# Patient Record
Sex: MALE | Race: WHITE | NOT HISPANIC OR LATINO | Employment: UNEMPLOYED | URBAN - METROPOLITAN AREA
[De-identification: names, ages, dates, MRNs, and addresses within clinical notes are randomized per-mention and may not be internally consistent; named-entity substitution may affect disease eponyms.]

---

## 2017-01-03 ENCOUNTER — OFFICE VISIT (OUTPATIENT)
Dept: INTERNAL MEDICINE | Facility: CLINIC | Age: 57
End: 2017-01-03

## 2017-01-03 VITALS
WEIGHT: 174 LBS | DIASTOLIC BLOOD PRESSURE: 77 MMHG | HEART RATE: 76 BPM | SYSTOLIC BLOOD PRESSURE: 112 MMHG | BODY MASS INDEX: 21.45 KG/M2

## 2017-01-03 DIAGNOSIS — Z13.220 SCREENING FOR HYPERLIPIDEMIA: ICD-10-CM

## 2017-01-03 DIAGNOSIS — Z11.59 NEED FOR HEPATITIS C SCREENING TEST: Primary | ICD-10-CM

## 2017-01-03 ASSESSMENT — PAIN SCALES - GENERAL: PAINLEVEL: NO PAIN (0)

## 2017-01-03 NOTE — NURSING NOTE
Chief Complaint   Patient presents with     Recheck Medication     Pt is here to recheck his medications.      Kallie Christina LPN January 3, 2017 3:19 PM

## 2017-01-03 NOTE — PATIENT INSTRUCTIONS
[facility] Medication Refill Request Information:  * Please contact your pharmacy regarding ANY request for medication refills.  ** [facility] Prescription Fax = [phone]  * Please allow 3 business days for routine medication refills.  * Please allow 5 business days for controlled substance medication refills.     [facility] Test Result notification information:  *You will be notified with in 7-10 days of your appointment day regarding the results of your test.  If you are on MyChart you will be notified as soon as the provider has reviewed the results and signed off on them.

## 2017-01-03 NOTE — PROGRESS NOTES
HPI: Prasanna Baldwin is a 56 year old male who comes in for ear check as he thinks his ears might need cerumen evacuation as this has been a problem for him in the past.     After this appt his wife is picking him up and they are to visit their oldest son in the psyche unit at [facility] where he has been for 4 days after he was apprehended walking on roofs of cars downtown last week.  He continues to use drugs,  He dropped out of cosmetology school, and be very defiant of is parents. He lives in an apartment with two  other drug using men.      Prasanna has been seeing a counselor who prescribed Lorazepam 2 mg up to 3 x a day and he usually takes it twice a day.  Later in the day and at .     Prasanna has not been sleeping well.  He is suppose to go out of town for three days next week for work but is concerned about leaving his wife with no support.     His youngest son is doing ok and he and his wife have been taking care that their relationship does not suffer.     He is running for stress relief and went for a run before coming in for this appointment.     Patient Active Problem List   Diagnosis     Scapulothoracic syndrome     Gastroesophageal reflux disease without esophagitis       He has a medical hx of  does not have any pertinent problems on file.    Current Outpatient Prescriptions   Medication Sig Dispense Refill     LORAZEPAM PO Take 2 mg by mouth 3 times daily       Ranitidine HCl (ZANTAC PO) 150 mg       tadalafil (CIALIS) 20 MG tablet Take 1 tablet (20 mg) by mouth daily as needed for erectile dysfunction 30 tablet 1     Multiple Vitamin (DAILY MULTIVITAMIN PO) Take  by mouth.           ALLERGIES: Nkda    PAST MEDICAL HX:   Past Medical History   Diagnosis Date     Right rib fracture      9th rib, non-displaced.        PAST SURGICAL HX:   Past Surgical History   Procedure Laterality Date     Cholecystectomy  8/23/2006     Arthroscopic reconstruction anterior cruciate ligament  8/1/2001       IMMUNIZATION HX:    Immunization History   Administered Date(s) Administered     Influenza (IIV3) 12/20/2004     TD (ADULT, 7+) 08/01/2004     TDAP (BOOSTRIX AGES 10-64) 08/10/2012       SOCIAL HX:   Social History     Social History Narrative    , teenage adopted childen.  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      ROS: 3 system ROS reviewed w/o changes except for above      OBJECTIVE:  /77 mmHg  Pulse 76  Wt 78.926 kg (174 lb)   Wt Readings from Last 1 Encounters:   01/03/17 78.926 kg (174 lb)     Constitutional: no distress, comfortable, pleasant   Eyes: anicteric  Ears, Nose and Throat: tympanic membranes clear.   Cardiovascular: RRR nl BP  Respiratory: no distress.   Neurological:  normal gait,normal speech, no tremor   Psychological: appropriate mood     ASSESSMENT/PLAN:  (Z11.59) Need for hepatitis C screening test  (primary encounter diagnosis)  Comment: he has a FIT test lit at home.    Plan: Hepatitis C Screen Reflex to HCV RNA Quant and         Genotype       We discussed the problems with Lorazepam use and physical addiction issues.  He is trying to use it very discretionally.  He plans to continue his counseling for his family issues.     Total time spent 25 minutes.  More than 50% of the time spent with Mr. Baldwin on counseling / coordinating his care    Mara WARD CNP

## 2017-05-02 ENCOUNTER — OFFICE VISIT (OUTPATIENT)
Dept: INTERNAL MEDICINE | Facility: CLINIC | Age: 57
End: 2017-05-02

## 2017-05-02 VITALS
RESPIRATION RATE: 16 BRPM | SYSTOLIC BLOOD PRESSURE: 122 MMHG | BODY MASS INDEX: 20.55 KG/M2 | OXYGEN SATURATION: 98 % | TEMPERATURE: 97.6 F | WEIGHT: 166.6 LBS | DIASTOLIC BLOOD PRESSURE: 80 MMHG | HEART RATE: 70 BPM

## 2017-05-02 DIAGNOSIS — Z11.59 NEED FOR HEPATITIS C SCREENING TEST: ICD-10-CM

## 2017-05-02 DIAGNOSIS — Z13.220 SCREENING FOR HYPERLIPIDEMIA: ICD-10-CM

## 2017-05-02 DIAGNOSIS — Z12.11 SCREEN FOR COLON CANCER: ICD-10-CM

## 2017-05-02 DIAGNOSIS — F41.9 ANXIETY: Primary | ICD-10-CM

## 2017-05-02 RX ORDER — CALCIUM CARBONATE 500 MG/1
1 TABLET, CHEWABLE ORAL WEEKLY
COMMUNITY
End: 2020-11-01

## 2017-05-02 RX ORDER — CITALOPRAM HYDROBROMIDE 20 MG/1
20 TABLET ORAL DAILY
Qty: 90 TABLET | Refills: 3 | Status: SHIPPED | OUTPATIENT
Start: 2017-05-02 | End: 2017-06-27

## 2017-05-02 RX ORDER — TRAZODONE HYDROCHLORIDE 100 MG/1
100 TABLET ORAL
COMMUNITY
End: 2020-09-20

## 2017-05-02 ASSESSMENT — PAIN SCALES - GENERAL: PAINLEVEL: MILD PAIN (2)

## 2017-05-02 NOTE — NURSING NOTE
Chief Complaint   Patient presents with     Cough     pt here for cough x 1 week     Fatigue     pt states being tired all the time     Christina Nichols CMA at 4:12 PM on 5/2/2017.

## 2017-05-02 NOTE — MR AVS SNAPSHOT
After Visit Summary   5/2/2017    Prasanna Baldwin    MRN: [id number]           Patient Information     Date Of Birth          1960        Visit Information        Provider Department      5/2/2017 4:00 PM Mara Beard, APRN CNP [facility]        Today's Diagnoses     Anxiety    -  1    Screen for colon cancer        Need for hepatitis C screening test        Screening for hyperlipidemia          Care Instructions    [facility] Medication Refill Request Information:  * Please contact your pharmacy regarding ANY request for medication refills.  ** [facility] Prescription Fax = [phone]  * Please allow 3 business days for routine medication refills.  * Please allow 5 business days for controlled substance medication refills.     [facility] Test Result notification information:  *You will be notified with in 7-10 days of your appointment day regarding the results of your test.  If you are on MyChart you will be notified as soon as the provider has reviewed the results and signed off on them.    Primary Care Center [phone]           Follow-ups after your visit        Your next 10 appointments already scheduled     Jun 27, 2017 11:00 AM CDT   Return Visit with Dain Marie MD   [facility]    [address] [place] [address] [phone]            Jun 29, 2017 10:30 AM CDT   LAB with  LAB   [facility] ([facility])    [address] [address] [phone]           Patient must bring picture ID.  Patient should be prepared to give a urine specimen  Please do not eat 10-12 hours before your appointment if you are coming in fasting for labs on lipids, cholesterol, or glucose (sugar).  Pregnant women should follow their Care Team instructions. Water with medications is okay. Do not drink coffee or other fluids.   If you have concerns about  taking  your medications, please ask at office or if scheduling via Huoshi, send a message by clicking on Secure Messaging, Message Your Care Team.            Jun 29, 2017 11:45 AM CDT   (Arrive by 11:30 AM)   Return Visit with SLYVIA Flores [facility] ([facility])    [address] [facility]  [address] [facility] [id number] [phone]              Future tests that were ordered for you today     Open Future Orders        Priority Expected Expires Ordered    Hepatitis C Screen Reflex to HCV RNA Quant and Genotype Routine 5/2/2017 5/2/2018 5/2/2017    Lipid panel reflex to direct LDL - -(Today) Routine 5/2/2017 5/2/2018 5/2/2017            Who to contact     Please call your clinic at [phone] to:    Ask questions about your health    Make or cancel appointments    Discuss your medicines    Learn about your test results    Speak to your doctor   If you have compliments or concerns about an experience at your clinic, or if you wish to file a complaint, please contact [facility] Patient Relations at [phone] or email us at [email or web address]         Additional Information About Your Visit        Huoshi Information     Huoshi gives you secure access to your electronic health record. If you see a primary care provider, you can also send messages to your care team and make appointments. If you have questions, please call your primary care clinic.  If you do not have a primary care provider, please call [phone] and they will assist you.      Huoshi is an electronic gateway that provides easy, online access to your medical records. With Huoshi, you can request a clinic appointment, read your test results, renew a prescription or communicate with your care team.     To access your existing account, please contact your [facility] Clinic or call [phone] for assistance.         Care EveryWhere ID     This is your Care EveryWhere ID. This could be used by other organizations to access your [place] medical records  [id number]        Your Vitals Were     Pulse Temperature Respirations Pulse Oximetry BMI (Body Mass Index)       70 97.6  F (36.4  C) (Oral) 16 98% 20.55 kg/m2        Blood Pressure from Last 3 Encounters:   05/02/17 122/80   01/03/17 112/77   07/18/16 140/87    Weight from Last 3 Encounters:   05/02/17 75.6 kg (166 lb 9.6 oz)   01/03/17 78.9 kg (174 lb)   06/28/16 81.2 kg (179 lb)                 Today's Medication Changes          These changes are accurate as of: 5/2/17  5:49 PM.  If you have any questions, ask your nurse or doctor.               Start taking these medicines.        Dose/Directions    citalopram 20 MG tablet   Commonly known as:  celeXA   Used for:  Anxiety   Started by:  Mara Beard APRN CNP        Dose:  20 mg   Take 1 tablet (20 mg) by mouth daily   Quantity:  90 tablet   Refills:  3            Where to get your medicines      These medications were sent to [id number] - [place], [place] - [address] AT [facility] [address]  [facility] [address], [place] [address]    Hours:  24-hours Phone:  [phone]     citalopram 20 MG tablet                Primary Care Provider Office Phone # Fax #    SYLVIA Flores [phone]       [facility] [address] [place] [address] [address]  [facility] [address]        Thank you!     Thank you for choosing [facility]  for your care. Our goal is always to provide you with excellent care. Hearing back from our patients is one way we can continue to improve our services. Please take a few minutes to complete the written survey that you may receive in the mail after your visit with us. Thank you!             Your Updated Medication List - Protect others around you: Learn how to safely use, store and throw away your medicines at  www.disposemymeds.org.          This list is accurate as of: 5/2/17  5:49 PM.  Always use your most recent med list.                   Brand Name Dispense Instructions for use    calcium carbonate 500 MG chewable tablet    TUMS     Take 1 chew tab by mouth daily       citalopram 20 MG tablet    celeXA    90 tablet    Take 1 tablet (20 mg) by mouth daily       DAILY MULTIVITAMIN PO      Take  by mouth.       LORAZEPAM PO      Take 2 mg by mouth 3 times daily       traZODone 100 MG tablet    DESYREL     Take 100 mg by mouth At Bedtime       ZANTAC PO      150 mg

## 2017-05-02 NOTE — PROGRESS NOTES
HPI: Prasanna Baldwin is a 57 year old male who comes in for one week of dry cough, mildly sore throat, headache, decreased concentration,ear discomfort, tightness with breathing, aching all over , general malaise.  He has been home for the past 4 days. His GERD is disrupting his eating and he has limited his food to bland foods.  He has lost weight.  He stopped exercising a week ago.  He takes a daily vitamin and has been taking extra vitamin C since developing above symptoms.  He does not feel he is improving.  He denies fever but sleeps with many blankets over him and still feels cold.     He thinks his weight loss is related to stress at home.  His oldest son is in his fifth treatment program since the first of the year.   Things are strained at home. He is taking Lorazepam up to 3 x a day.    He exercises as a stress reliever.     Patient Active Problem List   Diagnosis     Scapulothoracic syndrome     Gastroesophageal reflux disease without esophagitis       He has a medical hx of  does not have any pertinent problems on file.    Current Outpatient Prescriptions   Medication Sig Dispense Refill     LORAZEPAM PO Take 2 mg by mouth 3 times daily       Ranitidine HCl (ZANTAC PO) 150 mg       tadalafil (CIALIS) 20 MG tablet Take 1 tablet (20 mg) by mouth daily as needed for erectile dysfunction 30 tablet 1     Multiple Vitamin (DAILY MULTIVITAMIN PO) Take  by mouth.           ALLERGIES: Nkda [no known drug allergies]    PAST MEDICAL HX:   Past Medical History:   Diagnosis Date     Right rib fracture     9th rib, non-displaced.        PAST SURGICAL HX:   Past Surgical History:   Procedure Laterality Date     ARTHROSCOPIC RECONSTRUCTION ANTERIOR CRUCIATE LIGAMENT  8/1/2001     CHOLECYSTECTOMY  8/23/2006       IMMUNIZATION HX:   Immunization History   Administered Date(s) Administered     Influenza (IIV3) 12/20/2004     TD (ADULT, 7+) 08/01/2004     TDAP Vaccine (Boostrix) 08/10/2012       SOCIAL HX:   Social History      Social History Narrative    , teenage adopted childen.  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        ROS:   CONSTITUTIONAL: see HPI.  SKIN: no worrisome rashes, no worrisome moles, no worrisome lesions  EYES: no acute vision problems or changes  ENT: no ear problems, no mouth problems, no throat problems  RESP:see HPI  CV: no chest pain, no palpitations, no new or worsening peripheral edema  GI: he has GERD and decreased appetite  : no frequency, no dysuria, no hematuria.  MS: see HPI  NEURO: see HPI  HEME: no easy bruising, no bleeding problems  PSYCHIATRIC: he feels anxious.    OBJECTIVE:  /80 (BP Location: Right arm, Patient Position: Chair, Cuff Size: Adult Large)  Pulse 70  Resp 16  Wt 75.6 kg (166 lb 9.6 oz)  SpO2 98%  BMI 20.55 kg/m2   Wt Readings from Last 1 Encounters:   05/02/17 75.6 kg (166 lb 9.6 oz)   his weight is down 8.5 pounds since 1/2017. Temp 98.7  Constitutional: no distress, comfortable, pleasant   Eyes: anicteric,conjunctiva pink.  Ears, Nose and Throat: tympanic membranes clear,  throat clear.   Neck: supple with full range of motion, no thyromegaly.   Cardiovascular: regular rate and rhythm, normal S1 and S2, no murmurs, rubs or gallops.  Respiratory: clear to auscultation, no wheezes or crackles, normal breath sounds   Gastrointestinal: positive bowel sounds, nontender, no hepatosplenomegaly, no masses   Musculoskeletal: full range of motion, no edema   Skin: no concerning lesions, no jaundice, temp normal, tanned.  Neurological: normal gait,normal speech, no tremor   Psychological: appropriate mood   LYMPH: no cervical,  supraclavicular, infraclavicular l nodes.    ASSESSMENT/PLAN:  Prasanna was seen today for cough and fatigue.    Diagnoses and all orders for this visit:    Anxiety  -     citalopram (CELEXA) 20 MG tablet; Take 1 tablet (20 mg) by mouth daily.  He is to message me with results of the new medication with the goal of  weaning off the Lorazepam.      Screen for colon cancer    Need for hepatitis C screening test  -     Hepatitis C Screen Reflex to HCV RNA Quant and Genotype; Future    Screening for hyperlipidemia  -     Lipid panel reflex to direct LDL - -(Today); Future    He will perform labs on RTC in June when he present for his annual physical.        Total time spent  25 minutes.  More than 50% of the time spent with Mr. Baldwin on counseling / coordinating his care    Mara WARD, CNP

## 2017-05-02 NOTE — LETTER
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  I have recommended he avoid returning to work until he is asymptomatic and should not return until at least 5/5 if he is feeling improved.                 Mara WARD, CNP

## 2017-05-02 NOTE — PATIENT INSTRUCTIONS
[facility] Medication Refill Request Information:  * Please contact your pharmacy regarding ANY request for medication refills.  ** [facility] Prescription Fax = [phone]  * Please allow 3 business days for routine medication refills.  * Please allow 5 business days for controlled substance medication refills.     [facility] Test Result notification information:  *You will be notified with in 7-10 days of your appointment day regarding the results of your test.  If you are on MyChart you will be notified as soon as the provider has reviewed the results and signed off on them.    [facility] [phone]

## 2017-05-03 ENCOUNTER — HOSPITAL ENCOUNTER (OUTPATIENT)
Facility: CLINIC | Age: 57
Setting detail: SPECIMEN
Discharge: HOME | End: 2017-05-03
Admitting: NURSE PRACTITIONER
Payer: COMMERCIAL

## 2017-05-03 PROCEDURE — 82274 ASSAY TEST FOR BLOOD FECAL: CPT | Performed by: NURSE PRACTITIONER

## 2017-05-04 DIAGNOSIS — Z12.11 SCREEN FOR COLON CANCER: ICD-10-CM

## 2017-05-04 LAB — HEMOCCULT STL QL IA: POSITIVE

## 2017-05-06 DIAGNOSIS — K92.1 BLOOD IN STOOL: Primary | ICD-10-CM

## 2017-05-07 ENCOUNTER — MYC MEDICAL ADVICE (OUTPATIENT)
Dept: INTERNAL MEDICINE | Facility: CLINIC | Age: 57
End: 2017-05-07

## 2017-05-08 ENCOUNTER — TELEPHONE (OUTPATIENT)
Dept: GASTROENTEROLOGY | Facility: CLINIC | Age: 57
End: 2017-05-08

## 2017-05-09 ENCOUNTER — TELEPHONE (OUTPATIENT)
Dept: GASTROENTEROLOGY | Facility: CLINIC | Age: 57
End: 2017-05-09

## 2017-06-27 ENCOUNTER — OFFICE VISIT (OUTPATIENT)
Dept: NEUROLOGY | Facility: CLINIC | Age: 57
End: 2017-06-27
Payer: COMMERCIAL

## 2017-06-27 VITALS
WEIGHT: 165 LBS | DIASTOLIC BLOOD PRESSURE: 63 MMHG | SYSTOLIC BLOOD PRESSURE: 104 MMHG | OXYGEN SATURATION: 97 % | HEIGHT: 74 IN | HEART RATE: 61 BPM | BODY MASS INDEX: 21.17 KG/M2

## 2017-06-27 DIAGNOSIS — G56.80 SCAPULOTHORACIC SYNDROME: Primary | ICD-10-CM

## 2017-06-27 PROCEDURE — 99212 OFFICE O/P EST SF 10 MIN: CPT | Performed by: PSYCHIATRY & NEUROLOGY

## 2017-06-27 ASSESSMENT — PAIN SCALES - GENERAL: PAINLEVEL: MILD PAIN (2)

## 2017-06-27 NOTE — NURSING NOTE
"Prasanna Baldwin's goals for this visit include: return  He requests these members of his care team be copied on today's visit information:     PCP: Mara Beard    Referring Provider:  ESTABLISHED PATIENT  No address on file    Chief Complaint   Patient presents with     RECHECK       Initial /63  Pulse 61  Ht 1.88 m (6' 2\")  Wt 74.8 kg (165 lb)  SpO2 97%  BMI 21.18 kg/m2 Estimated body mass index is 21.18 kg/(m^2) as calculated from the following:    Height as of this encounter: 1.88 m (6' 2\").    Weight as of this encounter: 74.8 kg (165 lb).  Medication Reconciliation: complete    Do you need any medication refills at today's visit? n  "

## 2017-06-27 NOTE — PROGRESS NOTES
[id number]    Dictation destroyed by computer hack. Visit note based upon recollection:    Continued difficulty using upper limbs in specific postures as before. Exam notable for winging of right scapula at rest; not with activation of serratus. Strength full throughout, including trapezius, except for right rhomboid which is weak with reduced ROM as well.     Neuromuscular exam in short is little different except perhaps for more prominent scapular winging at this visit than at some previous visits. Offered ortho re-evaluation and he declines.    RTC 1 year or PRN.

## 2017-06-27 NOTE — LETTER
June 27, 2017      RE: Prasanna Baldwin  [address] [place] [address]      [id number]    Dictation destroyed by computer hack. Visit note based upon recollection:    Continued difficulty using upper limbs in specific postures as before. Exam notable for winging of right scapula at rest; not with activation of serratus. Strength full throughout, including trapezius, except for right rhomboid which is weak with reduced ROM as well.     Neuromuscular exam in short is little different except perhaps for more prominent scapular winging at this visit than at some previous visits. Offered ortho re-evaluation and he declines.    RTC 1 year or PRN.    Dain Marie MD

## 2017-06-27 NOTE — MR AVS SNAPSHOT
After Visit Summary   6/27/2017    Prasanna Baldwin    MRN: [id number]           Patient Information     Date Of Birth          1960        Visit Information        Provider Department      6/27/2017 11:00 AM Dain Marie MD [facility]         Follow-ups after your visit        Your next 10 appointments already scheduled     Jun 29, 2017 10:30 AM CDT   LAB with  LAB   [facility] ([facility])    [address]  [address] [facility] [id number] [phone]           Patient must bring picture ID.  Patient should be prepared to give a urine specimen  Please do not eat 10-12 hours before your appointment if you are coming in fasting for labs on lipids, cholesterol, or glucose (sugar).  Pregnant women should follow their Care Team instructions. Water with medications is okay. Do not drink coffee or other fluids.   If you have concerns about taking  your medications, please ask at office or if scheduling via Bee On The Got, send a message by clicking on Secure Messaging, Message Your Care Team.            Jun 29, 2017 11:45 AM CDT   (Arrive by 11:30 AM)   Return Visit with SYLVIA Flores CNP   [facility] ([facility])    [address] [address] [phone]            Jun 26, 2018 11:00 AM CDT   [facility]   [facility]    [address] [place] [address] [phone]              Who to contact     If you have questions or need follow up information about today's clinic visit or your schedule please contact [facility] directly at [phone].  Normal or non-critical lab and imaging results will be communicated to you by MyChart, letter or phone within 4 business days after the clinic has received the results. If you do not hear from us within 7 days, please contact  "the clinic through Onevestt or phone. If you have a critical or abnormal lab result, we will notify you by phone as soon as possible.  Submit refill requests through Tangoe or call your pharmacy and they will forward the refill request to us. Please allow 3 business days for your refill to be completed.          Additional Information About Your Visit        OMGPOPhart Information     Tangoe gives you secure access to your electronic health record. If you see a primary care provider, you can also send messages to your care team and make appointments. If you have questions, please call your primary care clinic.  If you do not have a primary care provider, please call [phone] and they will assist you.      Tangoe is an electronic gateway that provides easy, online access to your medical records. With Tangoe, you can request a clinic appointment, read your test results, renew a prescription or communicate with your care team.     To access your existing account, please contact your [facility] or call [phone] for assistance.        Care EveryWhere ID     This is your Care EveryWhere ID. This could be used by other organizations to access your [place] medical records  [id number]        Your Vitals Were     Pulse Height Pulse Oximetry BMI (Body Mass Index)          61 1.88 m (6' 2\") 97% 21.18 kg/m2         Blood Pressure from Last 3 Encounters:   06/27/17 104/63   05/02/17 122/80   01/03/17 112/77    Weight from Last 3 Encounters:   06/27/17 74.8 kg (165 lb)   05/02/17 75.6 kg (166 lb 9.6 oz)   01/03/17 78.9 kg (174 lb)              Today, you had the following     No orders found for display       Primary Care Provider Office Phone # Fax #    Mara SYLVIA Castorena [phone]       PRIMARY CARE CENTER [address]  [facility] [address]        Equal Access to Services     FAUSTINO KENYON: bethel Hodge qaybta " rafat lunusrat domingo ah. So [facility] [phone].    ATENCIÓN: Si jailyn ruiz, tiene a vickers disposición servicios gratuitos de asistencia lingüística. Herlinda al [phone].    We comply with applicable federal civil rights laws and [place] laws. We do not discriminate on the basis of race, color, national origin, age, disability sex, sexual orientation or gender identity.            Thank you!     Thank you for choosing [facility]  for your care. Our goal is always to provide you with excellent care. Hearing back from our patients is one way we can continue to improve our services. Please take a few minutes to complete the written survey that you may receive in the mail after your visit with us. Thank you!             Your Updated Medication List - Protect others around you: Learn how to safely use, store and throw away your medicines at www.disposemymeds.org.          This list is accurate as of: 6/27/17 11:41 AM.  Always use your most recent med list.                   Brand Name Dispense Instructions for use Diagnosis    calcium carbonate 500 MG chewable tablet    TUMS     Take 1 chew tab by mouth daily        DAILY MULTIVITAMIN PO      Take  by mouth.        LORAZEPAM PO      Take 2 mg by mouth 3 times daily        traZODone 100 MG tablet    DESYREL     Take 100 mg by mouth At Bedtime        ZANTAC PO      150 mg

## 2017-06-29 ENCOUNTER — OFFICE VISIT (OUTPATIENT)
Dept: INTERNAL MEDICINE | Facility: CLINIC | Age: 57
End: 2017-06-29

## 2017-06-29 VITALS
RESPIRATION RATE: 16 BRPM | HEIGHT: 74 IN | SYSTOLIC BLOOD PRESSURE: 108 MMHG | HEART RATE: 65 BPM | DIASTOLIC BLOOD PRESSURE: 75 MMHG | BODY MASS INDEX: 21.33 KG/M2 | WEIGHT: 166.2 LBS

## 2017-06-29 DIAGNOSIS — R63.4 LOSS OF WEIGHT: Primary | ICD-10-CM

## 2017-06-29 DIAGNOSIS — Z13.220 SCREENING FOR HYPERLIPIDEMIA: ICD-10-CM

## 2017-06-29 DIAGNOSIS — R63.4 LOSS OF WEIGHT: ICD-10-CM

## 2017-06-29 DIAGNOSIS — Z11.59 NEED FOR HEPATITIS C SCREENING TEST: ICD-10-CM

## 2017-06-29 DIAGNOSIS — R74.01 ELEVATED ALT MEASUREMENT: ICD-10-CM

## 2017-06-29 DIAGNOSIS — R10.13 ABDOMINAL PAIN, EPIGASTRIC: ICD-10-CM

## 2017-06-29 LAB
ALBUMIN SERPL-MCNC: 3.6 G/DL (ref 3.4–5)
ALP SERPL-CCNC: 50 U/L (ref 40–150)
ALT SERPL W P-5'-P-CCNC: 71 U/L (ref 0–70)
ANION GAP SERPL CALCULATED.3IONS-SCNC: 9 MMOL/L (ref 3–14)
AST SERPL W P-5'-P-CCNC: 34 U/L (ref 0–45)
BILIRUB SERPL-MCNC: 0.8 MG/DL (ref 0.2–1.3)
BUN SERPL-MCNC: 6 MG/DL (ref 7–30)
CALCIUM SERPL-MCNC: 8.6 MG/DL (ref 8.5–10.1)
CHLORIDE SERPL-SCNC: 101 MMOL/L (ref 94–109)
CHOLEST SERPL-MCNC: 174 MG/DL
CO2 SERPL-SCNC: 24 MMOL/L (ref 20–32)
CREAT SERPL-MCNC: 0.69 MG/DL (ref 0.66–1.25)
ERYTHROCYTE [DISTWIDTH] IN BLOOD BY AUTOMATED COUNT: 11.9 % (ref 10–15)
GFR SERPL CREATININE-BSD FRML MDRD: ABNORMAL ML/MIN/1.7M2
GLUCOSE SERPL-MCNC: 84 MG/DL (ref 70–99)
HCT VFR BLD AUTO: 43.5 % (ref 40–53)
HDLC SERPL-MCNC: 74 MG/DL
HGB BLD-MCNC: 15.4 G/DL (ref 13.3–17.7)
LDLC SERPL CALC-MCNC: 83 MG/DL
MCH RBC QN AUTO: 32.3 PG (ref 26.5–33)
MCHC RBC AUTO-ENTMCNC: 35.4 G/DL (ref 31.5–36.5)
MCV RBC AUTO: 91 FL (ref 78–100)
NONHDLC SERPL-MCNC: 100 MG/DL
PLATELET # BLD AUTO: 182 10E9/L (ref 150–450)
POTASSIUM SERPL-SCNC: 4.3 MMOL/L (ref 3.4–5.3)
PROT SERPL-MCNC: 7.1 G/DL (ref 6.8–8.8)
RBC # BLD AUTO: 4.77 10E12/L (ref 4.4–5.9)
SODIUM SERPL-SCNC: 135 MMOL/L (ref 133–144)
TRIGL SERPL-MCNC: 85 MG/DL
WBC # BLD AUTO: 3.9 10E9/L (ref 4–11)

## 2017-06-29 ASSESSMENT — PAIN SCALES - GENERAL: PAINLEVEL: MODERATE PAIN (4)

## 2017-06-29 NOTE — PROGRESS NOTES
HPI: Prasanna Baldwin is a 57 year old male who comes in after seeing his mental health provider.  He did not feel well after starting Zoloft so he has a new RX sent to his pharmacy today.  He is not sure what it is.  He is not sleeping well.  He still has abdominal pain and has been losing weight. He cannot eat fresh fruit or vegetables.   He stopped running because of his weight loss. He still lifts weights twice a week. He is still drinking a couple of beers at night to relax.  His FIT 5/3/2017 test was positive but he thinks this is all related to his stress and he would like to repeat this at a slightly later time.      He and his wife re going to a marriage counselor and they are each seeing an individual counselor as well. He is feeling that he and his wife might be headed for a divorce since she is very negative toward their younger son Juan and himself.     He is asking this provider to see his 16 year old son as a patient.     Patient Active Problem List   Diagnosis     Scapulothoracic syndrome     Gastroesophageal reflux disease without esophagitis       Current Outpatient Prescriptions   Medication Sig Dispense Refill     ranitidine (ZANTAC) 300 MG tablet Take 1 tablet (300 mg) by mouth At Bedtime 90 tablet 3     traZODone (DESYREL) 100 MG tablet Take 100 mg by mouth At Bedtime       calcium carbonate (TUMS) 500 MG chewable tablet Take 1 chew tab by mouth daily       LORAZEPAM PO Take 2 mg by mouth 3 times daily       Ranitidine HCl (ZANTAC PO) 150 mg       Multiple Vitamin (DAILY MULTIVITAMIN PO) Take  by mouth.           ALLERGIES: Nkda [no known drug allergies]    PAST MEDICAL HX:   Past Medical History:   Diagnosis Date     Right rib fracture     9th rib, non-displaced.        PAST SURGICAL HX:   Past Surgical History:   Procedure Laterality Date     ARTHROSCOPIC RECONSTRUCTION ANTERIOR CRUCIATE LIGAMENT  8/1/2001     CHOLECYSTECTOMY  8/23/2006       IMMUNIZATION HX:   Immunization History  "  Administered Date(s) Administered     Influenza (IIV3) 12/20/2004     TD (ADULT, 7+) 08/01/2004     TDAP Vaccine (Boostrix) 08/10/2012       SOCIAL HX:   Social History     Social History Narrative    , teenage adopted childen.  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        ROS:   CONSTITUTIONAL: no fatigue, he has had weight loss. He has lost 16pounds since 10/22/15.   SKIN: no worrisome rashes, no worrisome moles, no worrisome lesions  EYES: no acute vision problems or changes  ENT: no ear problems, no mouth problems, no throat problems  RESP: no significant cough, no shortness of breath  CV: no chest pain, no palpitations, no new or worsening peripheral edema  GI: no nausea, no vomiting, no constipation, no diarrhea  : no frequency, no dysuria, no hematuria  MS: no claudication, no myalgias, no joint aches  NEURO: no weakness, no dizziness, no syncope, no headaches  ENDOCRINE: no temperature intolerance, no skin/hair changes  HEME: no easy bruising, no bleeding problems  PSYCHIATRIC: he feels extremely stressed.     OBJECTIVE:  /75  Pulse 65  Resp 16  Ht 1.88 m (6' 2\")  Wt 75.4 kg (166 lb 3.2 oz)  BMI 21.34 kg/m2   Wt Readings from Last 1 Encounters:   06/29/17 75.4 kg (166 lb 3.2 oz)     Constitutional: no distress, comfortable, pleasant   Eyes: anicteric,conjunctiva pink, normal extra-ocular movements   Ears, Nose and Throat: tympanic membranes clear, nose clear and free of lesions, throat clear.   Neck: supple with full range of motion, no thyromegaly.   Cardiovascular: regular rate and rhythm, normal S1 and S2, no murmurs, rubs or gallops, peripheral pulses full and symmetric   Respiratory: clear to auscultation, no wheezes or crackles, normal breath sounds   Gastrointestinal: positive bowel sounds, nontender, no hepatosplenomegaly, no masses   Musculoskeletal: full range of motion, no edema   Skin: no concerning lesions, no jaundice, temp normal "   Neurological: normal gait,normal speech, no tremor   Psychological: appropriate mood     ASSESSMENT/PLAN:  Prasanna was seen today for physical.    Diagnoses and all orders for this visit:    Loss of weight  -     CBC with platelets; Future  -     Comprehensive metabolic panel; Future    Abdominal pain, epigastric  -     Fecal cancer screen FIT; Future  -     ranitidine (ZANTAC) 300 MG tablet; Take 1 tablet (300 mg) by mouth At Bedtime    Elevated ALT measurement  -     ALT; Future to assess for trends.     Total time spent 40 minutes.  More than 50% of the time spent with Mr. Baldwin on counseling / coordinating his care    Mara WARD, CNP

## 2017-06-29 NOTE — MR AVS SNAPSHOT
After Visit Summary   6/29/2017    Prasanna Baldwin    MRN: [id number]           Patient Information     Date Of Birth          1960        Visit Information        Provider Department      6/29/2017 11:45 AM Mara Beard, APRN CNP [facility]        Today's Diagnoses     Loss of weight    -  1    Abdominal pain, epigastric        Elevated ALT measurement          Care Instructions    Primary Care Center Medication Refill Request Information:  * Please contact your pharmacy regarding ANY request for medication refills.  ** [facility] Prescription Fax = [phone]  * Please allow 3 business days for routine medication refills.  * Please allow 5 business days for controlled substance medication refills.     Primary Care Center Test Result notification information:  *You will be notified with in 7-10 days of your appointment day regarding the results of your test.  If you are on MyChart you will be notified as soon as the provider has reviewed the results and signed off on them.    Primary Care Center [phone]             Follow-ups after your visit        Your next 10 appointments already scheduled     Jun 26, 2018 11:00 AM CDT   Return Visit with Dain Marie MD   [facility]    [address] [place] [address] [phone]              Future tests that were ordered for you today     Open Future Orders        Priority Expected Expires Ordered    ALT Routine 12/29/2017 6/29/2018 6/29/2017    Fecal cancer screen FIT Routine 6/29/2017 9/27/2017 6/29/2017            Who to contact     Please call your clinic at [phone] to:    Ask questions about your health    Make or cancel appointments    Discuss your medicines    Learn about your test results    Speak to your doctor   If you have compliments or concerns about an experience at your clinic, or if you wish to file a complaint, please contact [facility]  "Physicians Patient Relations at [phone] or email us at [email or web address]         Additional Information About Your Visit        SingleHophart Information     Verious gives you secure access to your electronic health record. If you see a primary care provider, you can also send messages to your care team and make appointments. If you have questions, please call your primary care clinic.  If you do not have a primary care provider, please call [phone] and they will assist you.      Verious is an electronic gateway that provides easy, online access to your medical records. With Verious, you can request a clinic appointment, read your test results, renew a prescription or communicate with your care team.     To access your existing account, please contact your [facility] or call [phone] for assistance.        Care EveryWhere ID     This is your Care EveryWhere ID. This could be used by other organizations to access your [place] medical records  [id number]        Your Vitals Were     Pulse Respirations Height BMI (Body Mass Index)          65 16 1.88 m (6' 2\") 21.34 kg/m2         Blood Pressure from Last 3 Encounters:   06/29/17 108/75   06/27/17 104/63   05/02/17 122/80    Weight from Last 3 Encounters:   06/29/17 75.4 kg (166 lb 3.2 oz)   06/27/17 74.8 kg (165 lb)   05/02/17 75.6 kg (166 lb 9.6 oz)                 Today's Medication Changes          These changes are accurate as of: 6/29/17 12:24 PM.  If you have any questions, ask your nurse or doctor.               These medicines have changed or have updated prescriptions.        Dose/Directions    ranitidine 300 MG tablet   Commonly known as:  ZANTAC   This may have changed:    - medication strength  - how much to take  - how to take this  - when to take this   Used for:  Abdominal pain, epigastric   Changed by:  Mara Beard APRN CNP        Dose:  300 mg   Take 1 tablet (300 mg) by mouth At Bedtime "   Quantity:  90 tablet   Refills:  3            Where to get your medicines      These medications were sent to [id number] - [place], [place] - [address] AT [facility]  [address], [place] [address]    Hours:  24-hours Phone:  [phone]     ranitidine 300 MG tablet                Primary Care Provider Office Phone # Fax #    Mara Beard, APRN -[phone]       PRIMARY CARE CENTER [address] [place] [address] [address]  [facility] [place] [facility] [address]        Equal Access to Services     FAUSTINO WHITTAKER : Hadii aad ku hadasho Soomaali, waaxda luqadaha, qaybta kaalmada adeegyada, rafat kasper hayaajerry domingo . So [facility] [phone].    ATENCIÓN: Si habla español, tiene a vickers disposición servicios gratuitos de asistencia lingüística. [facility] [phone].    We comply with applicable federal civil rights laws and [place] laws. We do not discriminate on the basis of race, color, national origin, age, disability sex, sexual orientation or gender identity.            Thank you!     Thank you for choosing [facility]  for your care. Our goal is always to provide you with excellent care. Hearing back from our patients is one way we can continue to improve our services. Please take a few minutes to complete the written survey that you may receive in the mail after your visit with us. Thank you!             Your Updated Medication List - Protect others around you: Learn how to safely use, store and throw away your medicines at www.disposemymeds.org.          This list is accurate as of: 6/29/17 12:24 PM.  Always use your most recent med list.                   Brand Name Dispense Instructions for use Diagnosis    calcium carbonate 500 MG chewable tablet    TUMS     Take 1 chew tab by mouth daily        DAILY MULTIVITAMIN PO      Take  by mouth.        LORAZEPAM PO      Take 2 mg by mouth 3 times daily        ranitidine 300 MG tablet    ZANTAC    90  tablet    Take 1 tablet (300 mg) by mouth At Bedtime    Abdominal pain, epigastric       traZODone 100 MG tablet    DESYREL     Take 100 mg by mouth At Bedtime

## 2017-06-29 NOTE — PATIENT INSTRUCTIONS
[facility] Medication Refill Request Information:  * Please contact your pharmacy regarding ANY request for medication refills.  ** [facility] Prescription Fax = [phone]  * Please allow 3 business days for routine medication refills.  * Please allow 5 business days for controlled substance medication refills.     [facility] Test Result notification information:  *You will be notified with in 7-10 days of your appointment day regarding the results of your test.  If you are on MyChart you will be notified as soon as the provider has reviewed the results and signed off on them.    [facility] [phone]

## 2017-06-29 NOTE — NURSING NOTE
Chief Complaint   Patient presents with     Physical     pt here for physical     Christina Nichols CMA at 11:13 AM on 6/29/2017.

## 2017-07-03 LAB — HCV AB SERPL QL IA: NORMAL

## 2017-07-12 ENCOUNTER — MYC MEDICAL ADVICE (OUTPATIENT)
Dept: INTERNAL MEDICINE | Facility: CLINIC | Age: 57
End: 2017-07-12

## 2017-08-04 NOTE — PROGRESS NOTES
2017            Mara Beard NP    Primary Care Center   [address] [facility] [facility] [address]   [place], [place]  [address]       RE:  Prasanna Baldwin   MRN:  [id number]   :  1960      Dear Mara:      I saw Prasanna Baldwin in followup at the [facility] in [place].  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  Dr. Baldwin reports that over the past year he has noticed a limitation of movement at the right shoulder joint with certain activities such as most notably flexing at the shoulder joint.  He at times has to raise his right upper limb manually with his left to get it past a certain angle or has difficulty holding it outward in the motion used to shake a person's hand.  He has noticed no other substantial change, although he does feel that exercising with light weights every other day is necessary to maintain function in that limb.  There has been some discomfort in the left shoulder, but again, it remains modest and changes little.      PHYSICAL EXAMINATION:  The patient appears well.  Vital signs are normal.  Manual muscle testing demonstrates full strength in shoulder abduction in both the deltoid and supraspinatus ranges, full strength in infraspinatus, pectoralis and symmetric contraction of the latissimus with coughing.  The rest of his strength is full.  Reflexes are symmetric and preserved.  There are no pathologic reflexes.  As before, however, there is scapular winging at rest on the right which appears somewhat more prominent to me than it has in recent years.  There is not rotation of the scapula at rest.  The winging is less evident when forcefully pressing against the wall. Trapezius strength is full.      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  Romberg strength appears  reduced as well.  There is no difficulty on the left.      There are modest changes for the worse.  We discussed further evaluation of this with an orthopedic specialist or physical therapy.  I did not recommend further electrodiagnostic studies, however.  Dr. Baldwin prefers continue to treat this conservatively rather than seek further opinions.  He should contact me for referral if his condition worsens, however, and I would consider further electrodiagnostic studies under that circumstance as well.         Sincerely,      ANA DENNIS MD             D: 2017 11:45   T: 2017 16:44   MT: TS      Name:     JUAN C, WENDY   MRN:      [id number]        Account:      [id number]   :      1960      Document: [id number]       cc: Mara Beard NP

## 2017-11-30 ASSESSMENT — ENCOUNTER SYMPTOMS
DEPRESSION: 1
NERVOUS/ANXIOUS: 1

## 2017-12-14 ENCOUNTER — OFFICE VISIT (OUTPATIENT)
Dept: INTERNAL MEDICINE | Facility: CLINIC | Age: 57
End: 2017-12-14
Payer: COMMERCIAL

## 2017-12-14 VITALS
OXYGEN SATURATION: 99 % | WEIGHT: 166.2 LBS | HEART RATE: 79 BPM | HEIGHT: 74 IN | DIASTOLIC BLOOD PRESSURE: 79 MMHG | BODY MASS INDEX: 21.33 KG/M2 | SYSTOLIC BLOOD PRESSURE: 121 MMHG

## 2017-12-14 DIAGNOSIS — Z01.810 PRE-OPERATIVE CARDIOVASCULAR EXAMINATION: Primary | ICD-10-CM

## 2017-12-14 DIAGNOSIS — Z01.810 PRE-OPERATIVE CARDIOVASCULAR EXAMINATION: ICD-10-CM

## 2017-12-14 LAB
ALBUMIN SERPL-MCNC: 3.7 G/DL (ref 3.4–5)
ALP SERPL-CCNC: 70 U/L (ref 40–150)
ALT SERPL W P-5'-P-CCNC: 24 U/L (ref 0–70)
ANION GAP SERPL CALCULATED.3IONS-SCNC: 4 MMOL/L (ref 3–14)
AST SERPL W P-5'-P-CCNC: 20 U/L (ref 0–45)
BILIRUB SERPL-MCNC: 0.6 MG/DL (ref 0.2–1.3)
BUN SERPL-MCNC: 8 MG/DL (ref 7–30)
CALCIUM SERPL-MCNC: 8.4 MG/DL (ref 8.5–10.1)
CHLORIDE SERPL-SCNC: 96 MMOL/L (ref 94–109)
CO2 SERPL-SCNC: 32 MMOL/L (ref 20–32)
CREAT SERPL-MCNC: 0.74 MG/DL (ref 0.66–1.25)
ERYTHROCYTE [DISTWIDTH] IN BLOOD BY AUTOMATED COUNT: 11.7 % (ref 10–15)
GFR SERPL CREATININE-BSD FRML MDRD: >90 ML/MIN/1.7M2
GLUCOSE SERPL-MCNC: 119 MG/DL (ref 70–99)
HCT VFR BLD AUTO: 43.1 % (ref 40–53)
HGB BLD-MCNC: 14.8 G/DL (ref 13.3–17.7)
INTERPRETATION ECG - MUSE: NORMAL
MCH RBC QN AUTO: 31.8 PG (ref 26.5–33)
MCHC RBC AUTO-ENTMCNC: 34.3 G/DL (ref 31.5–36.5)
MCV RBC AUTO: 93 FL (ref 78–100)
PLATELET # BLD AUTO: 177 10E9/L (ref 150–450)
POTASSIUM SERPL-SCNC: 3.8 MMOL/L (ref 3.4–5.3)
PROT SERPL-MCNC: 7 G/DL (ref 6.8–8.8)
RBC # BLD AUTO: 4.66 10E12/L (ref 4.4–5.9)
SODIUM SERPL-SCNC: 132 MMOL/L (ref 133–144)
WBC # BLD AUTO: 4.7 10E9/L (ref 4–11)

## 2017-12-14 ASSESSMENT — PAIN SCALES - GENERAL: PAINLEVEL: NO PAIN (0)

## 2017-12-14 NOTE — MR AVS SNAPSHOT
After Visit Summary   12/14/2017    Prasanna Baldwin    MRN: [id number]           Patient Information     Date Of Birth          1960        Visit Information        Provider Department      12/14/2017 4:40 PM Mara Beard APRN CNP [facility]        Today's Diagnoses     Pre-operative cardiovascular examination    -  1      Care Instructions    Please go to the lab on the first floor before you leave today.           Follow-ups after your visit        Your next 10 appointments already scheduled     Jun 26, 2018 11:00 AM CDT   [facility] Visit with Dain Marie MD   [facility]    [id number] [place] [id number] [phone]              Who to contact     Please call your clinic at [phone] to:    Ask questions about your health    Make or cancel appointments    Discuss your medicines    Learn about your test results    Speak to your doctor   If you have compliments or concerns about an experience at your clinic, or if you wish to file a complaint, please contact [facility] Patient Relations at [phone] or email us at [email or web address]         Additional Information About Your Visit        MyChart Information     The[employer] gives you secure access to your electronic health record. If you see a primary care provider, you can also send messages to your care team and make appointments. If you have questions, please call your primary care clinic.  If you do not have a primary care provider, please call [phone] and they will assist you.      TheTakest is an electronic gateway that provides easy, online access to your medical records. With [employer], you can request a clinic appointment, read your test results, renew a prescription or communicate with your care team.     To access your existing account, please contact your [facility] Clinic or call  "[phone] for assistance.        Care EveryWhere ID     This is your Care EveryWhere ID. This could be used by other organizations to access your [facility] medical records  [id number]        Your Vitals Were     Pulse Height Pulse Oximetry BMI (Body Mass Index)          79 1.88 m (6' 2\") 99% 21.34 kg/m2         Blood Pressure from Last 3 Encounters:   12/14/17 121/79   06/29/17 108/75   06/27/17 104/63    Weight from Last 3 Encounters:   12/14/17 75.4 kg (166 lb 3.2 oz)   06/29/17 75.4 kg (166 lb 3.2 oz)   06/27/17 74.8 kg (165 lb)              We Performed the Following     EKG Performed in Clinic w/ Provider Reading Fee        Primary Care Provider Office Phone # Fax #    SYLVIA Flores [phone]       [address] [place] [phone]  [facility] [address]        Equal Access to Services     FAUSTINO WHITTAKER : Hadii aad ku hadasho Soomaali, waaxda luqadaha, qaybta kaalmada adeegyada, waxay idiin hayaan adeeg kharareagan laelvin . So [facility] [phone].    ATENCIÓN: Si habla español, tiene a vickers disposición servicios gratuitos de asistencia lingüística. Llame al [phone].    We comply with applicable federal civil rights laws and [place] laws. We do not discriminate on the basis of race, color, national origin, age, disability, sex, sexual orientation, or gender identity.            Thank you!     Thank you for choosing [facility]  for your care. Our goal is always to provide you with excellent care. Hearing back from our patients is one way we can continue to improve our services. Please take a few minutes to complete the written survey that you may receive in the mail after your visit with us. Thank you!             Your Updated Medication List - Protect others around you: Learn how to safely use, store and throw away your medicines at www.disposemymeds.org.          This list is accurate as of: 12/14/17  7:25 PM.  Always use your most recent med list.                   Brand " Name Dispense Instructions for use Diagnosis    calcium carbonate 500 MG chewable tablet    TUMS     Take 1 chew tab by mouth once a week        CYMBALTA PO      Take by mouth daily        DAILY MULTIVITAMIN PO      Take  by mouth.        LORAZEPAM PO      Take 2 mg by mouth 3 times daily        traZODone 100 MG tablet    DESYREL     Take 100 mg by mouth At Bedtime

## 2017-12-14 NOTE — PROGRESS NOTES
Surgeon (please enter first and last name):  Dr Charlie Granda  Fax number for Preop Evaluation:  [phone]  Location of Surgery: [facility]  Date of Surgery:  12.22.17  Procedure:  Blepharoplasty  History of reaction to anesthesia?  No    Answers for HPI/ROS submitted by the patient on 11/30/2017   General Symptoms: No  Skin Symptoms: No  HENT Symptoms: No  EYE SYMPTOMS: No  HEART SYMPTOMS: No  LUNG SYMPTOMS: No  INTESTINAL SYMPTOMS: No  URINARY SYMPTOMS: No  REPRODUCTIVE SYMPTOMS: No  SKELETAL SYMPTOMS: No  BLOOD SYMPTOMS: No  NERVOUS SYSTEM SYMPTOMS: No  MENTAL HEALTH SYMPTOMS: Yes  Nervous or Anxious: Yes  Depression: Yes

## 2017-12-14 NOTE — LETTER
12/14/2017      RE: Prasanna Baldwin  [address]  [facility] [id number]       Prasanna Baldwin is 57 year old male here at the request of Dr. Granda for cardiovascular, pulmonary, and perioperative risk assessment prior to surgery.The intended surgical procedure is bilateral blepharoplasty.  A copy of this note will be sent to the surgeon.    PROBLEM LIST:   Patient Active Problem List   Diagnosis     Scapulothoracic syndrome     Gastroesophageal reflux disease without esophagitis       PAST SURGICAL HX:   Past Surgical History:   Procedure Laterality Date     ARTHROSCOPIC RECONSTRUCTION ANTERIOR CRUCIATE LIGAMENT  8/1/2001     CHOLECYSTECTOMY  8/23/2006       FAMILY MEDICAL HX: No family history on file.    IMMUNIZATION HX:   Immunization History   Administered Date(s) Administered     Influenza (IIV3) PF 12/20/2004     TD (ADULT, 7+) 08/01/2004     TDAP Vaccine (Boostrix) 08/10/2012       MEDICATIONS:   Current Outpatient Prescriptions   Medication Sig Dispense Refill     DULoxetine HCl (CYMBALTA PO) Take by mouth daily       traZODone (DESYREL) 100 MG tablet Take 100 mg by mouth At Bedtime       calcium carbonate (TUMS) 500 MG chewable tablet Take 1 chew tab by mouth once a week        LORAZEPAM PO Take 2 mg by mouth 3 times daily       Multiple Vitamin (DAILY MULTIVITAMIN PO) Take  by mouth.       ranitidine (ZANTAC) 300 MG tablet Take 1 tablet (300 mg) by mouth At Bedtime (Patient not taking: Reported on 12/14/2017) 90 tablet 3       SOCIAL HX:   Social History     Social History     Marital status:      Spouse name: N/A     Number of children: 2     Years of education: N/A     Social History Main Topics     Smoking status: Light Tobacco Smoker     Types: Pipe     Smokeless tobacco: Never Used      Comment: 1 pipe/day     Alcohol use Yes      Comment: occ beer     Drug use: No     Sexual activity: Yes     Partners: Female     Other Topics Concern      Service No     Blood Transfusions No      Caffeine Concern No     Occupational Exposure No     Hobby Hazards No     Sleep Concern Yes     Stress Concern No     Weight Concern No     Special Diet No     Back Care No     Exercise Yes     runs and works out 6 days/week     Seat Belt Yes     Self-Exams Yes     Social History Narrative    , teenage adopted children.  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      This is a LOW risk surgery.    HPI:   Reason for surgery:  Scheduled for bilateral blepharoplasty 12/22/17 by Dr. Granda at [facility]. 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 This has been worsening over a period of 2-3 years.    Cardiovascular Risk:  This patient  ambulates without assist. without  chest pain. He IS able to climb a flight of stairs without chest pain.    The patient does not  have chest pain Exercise.    He does not  have a history of known cardiac disease.   The patient does not  have a history of stroke, and does not  have a history of valvular disease.    Pulmonary Risk:  In terms of risk factors for pulmonary complications, the patient does not  have a history of Asthma and Pulmonary Hypertenstion    Pulmonary Risk:  In terms of risk factors for pulmonary complications, the patient does not have a history of CHF, COPD, age >60 years, being functionally dependent.       Is this patient going to undergo any of the following procedures that would put him at higher risk of postoperative pulmonary complications:  Prolonged surgery (>3 hours): No  Abdominal surgery/thoracic surgery/neurosurgery/head and neck: No  Vascular/aortic aneurysm repair: No  General anesthesia: No    Perioperative Complications:  The patient does not  have a history of bleeding or clotting problems in the past. The patient has not  had complications from past surgeries.  The patient does not  have a family history of any anesthesia or surgical complications.    ROS:  Constitutional: no fevers, night sweats  "or unintentional weight change   Eyes: no vision change, diplopia or red eyes   Ears, Nose, Mouth, Throat: no tinnitus or hearing change, no epistaxis or nasal discharge, no oral lesions, throat clear   Cardiovascular: no chest pain, palpitations, or pain with walking, no orthopnea or PND   Respiratory: no dyspnea, cough, shortness of breath or wheezing   GI: no nausea, vomiting, diarrhea or constipation, no abdominal pain   : no change in urine, no dysuria or hematuria  Musculoskeletal: no joint or muscle pain or swelling   Integumentary: no concerning lesions or moles   Neuro: no loss of strength or sensation, no numbness or tingling, no tremor, no dizziness, no headache   Endo: no polyuria or polydipsia, no temperature intolerance   Heme/Lymph: no concerning bumps, no bleeding problems   Allergy: no environmental allergies   Psych: he is still taking lorazepam bid to tid under the care of a psychiatrist.  Marital and family stress at this time.     PHYSICAL EXAM:  /79  Pulse 79  Ht 1.88 m (6' 2\")  Wt 75.4 kg (166 lb 3.2 oz)  SpO2 99%  BMI 21.34 kg/m2    Wt Readings from Last 1 Encounters:   12/14/17 75.4 kg (166 lb 3.2 oz)     Constitutional: no distress, comfortable, pleasant.   Eyes: anicteric.  Ears, Nose and Throat: tympanic membranes clear, throat clear.  Cardiovascular: regular rate and rhythm, normal S1 and S2, no murmurs, rubs or gallops, peripheral pulses full and symmetric   Respiratory: clear to auscultation, no wheezes or crackles, normal breath sounds   Gastrointestinal: positive bowel sounds, nontender, no hepatosplenomegaly, no masses   Musculoskeletal: full range of motion, no edema   Skin: no concerning lesions, no jaundice   Neurological:n grossly normal, no tremor   Psychological: appropriate mood   Lymphatic: no cervical, supra or infra clavicular, axillary or inguinal lymphadenopathy    A/P:  The patient with   Patient Active Problem List   Diagnosis     Scapulothoracic syndrome "     Gastroesophageal reflux disease without esophagitis    presents prior to surgery for assessment of perioperative risk. The patient is at LOW   risk for cardiovascular complications and at LOW   risk for pulmonary complications of this LOW   risk surgery.      --Approval given to proceed with proposed procedure, without further diagnostic evaluation    ASA class 1 - Healthy patient, no medical problems  No evidence of functionally compromising cardiac or pulmonary status  Clear for anesthesia and surgery  The patient is recommended to hold aspirin or NSAIDS for 10 days prior to surgery.  The patient is instructed as to which medications to take with sips of water the morning of surgery    Pre-Op Plan:   Proceed with surgery as planned.  No food for 8 hours before surgery.  No liquid for 2 hours before surgery.   Call surgeon  If you develop a fever, respiratory illness or other symptoms.  Hold the following medications the morning of  Surgery:  Take the following medications the morning of surgery with a sip of water:    Letter sent to requesting surgeon  listed above  Written pre-operative instructions given.    Laboratory studies:  CMP and CBC normal.    Cardiovascular: EKG : no acute changes and no  presence of ST-segment elevation in lead III exceeding that in lead II, and no  ST-depression in leads I and aVL.    Total time spent 25 minutes.  More than 50% of the time spent with Mr. Baldwin on counseling / coordinating his care    Please contact our office if there are any further questions or information required about this patient.  Mara WARD CNP    Answers for HPI/ROS submitted by the patient on 11/30/2017     Surgeon (please enter first and last name):  Dr Charlie Granda  Fax number for Preop Evaluation:  [phone]  [facility] Surgery[facility]  Date of Surgery:  12.22.17  Procedure:  Blepharoplasty  History of reaction to anesthesia?  No    Answers for HPI/ROS submitted by  the patient on 11/30/2017     SYLVIA Rodas CNP

## 2017-12-14 NOTE — PROGRESS NOTES
Prasanna Baldwin is 57 year old male here at the request of Dr. Granda for cardiovascular, pulmonary, and perioperative risk assessment prior to surgery.The intended surgical procedure is bilateral blepharoplasty.  A copy of this note will be sent to the surgeon.    PROBLEM LIST:   Patient Active Problem List   Diagnosis     Scapulothoracic syndrome     Gastroesophageal reflux disease without esophagitis       PAST SURGICAL HX:   Past Surgical History:   Procedure Laterality Date     ARTHROSCOPIC RECONSTRUCTION ANTERIOR CRUCIATE LIGAMENT  8/1/2001     CHOLECYSTECTOMY  8/23/2006       FAMILY MEDICAL HX: No family history on file.    IMMUNIZATION HX:   Immunization History   Administered Date(s) Administered     Influenza (IIV3) PF 12/20/2004     TD (ADULT, 7+) 08/01/2004     TDAP Vaccine (Boostrix) 08/10/2012       MEDICATIONS:   Current Outpatient Prescriptions   Medication Sig Dispense Refill     DULoxetine HCl (CYMBALTA PO) Take by mouth daily       traZODone (DESYREL) 100 MG tablet Take 100 mg by mouth At Bedtime       calcium carbonate (TUMS) 500 MG chewable tablet Take 1 chew tab by mouth once a week        LORAZEPAM PO Take 2 mg by mouth 3 times daily       Multiple Vitamin (DAILY MULTIVITAMIN PO) Take  by mouth.       ranitidine (ZANTAC) 300 MG tablet Take 1 tablet (300 mg) by mouth At Bedtime (Patient not taking: Reported on 12/14/2017) 90 tablet 3       SOCIAL HX:   Social History     Social History     Marital status:      Spouse name: N/A     Number of children: 2     Years of education: N/A     Social History Main Topics     Smoking status: Light Tobacco Smoker     Types: Pipe     Smokeless tobacco: Never Used      Comment: 1 pipe/day     Alcohol use Yes      Comment: occ beer     Drug use: No     Sexual activity: Yes     Partners: Female     Other Topics Concern      Service No     Blood Transfusions No     Caffeine Concern No     Occupational Exposure No     Hobby Hazards No     Sleep  Concern Yes     Stress Concern No     Weight Concern No     Special Diet No     Back Care No     Exercise Yes     runs and works out 6 days/week     Seat Belt Yes     Self-Exams Yes     Social History Narrative    , teenage adopted children.  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        This is a LOW risk surgery.      HPI:   Reason for surgery:  Scheduled for bilateral blepharoplasty 12/22/17 by Dr. Granda at [facility]. 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 This has been worsening over a period of 2-3 years.    Cardiovascular Risk:  This patient  ambulates without assist. without  chest pain. He IS able to climb a flight of stairs without chest pain.    The patient does not  have chest pain Exercise.    He does not  have a history of known cardiac disease.   The patient does not  have a history of stroke, and does not  have a history of valvular disease.    Pulmonary Risk:  In terms of risk factors for pulmonary complications, the patient does not  have a history of Asthma and Pulmonary Hypertenstion    Pulmonary Risk:  In terms of risk factors for pulmonary complications, the patient does not have a history of CHF, COPD, age >60 years, being functionally dependent.       Is this patient going to undergo any of the following procedures that would put him at higher risk of postoperative pulmonary complications:  Prolonged surgery (>3 hours): No  Abdominal surgery/thoracic surgery/neurosurgery/head and neck: No  Vascular/aortic aneurysm repair: No  General anesthesia: No    Perioperative Complications:  The patient does not  have a history of bleeding or clotting problems in the past. The patient has not  had complications from past surgeries.  The patient does not  have a family history of any anesthesia or surgical complications.      ROS:  Constitutional: no fevers, night sweats or unintentional weight change   Eyes: no vision change, diplopia or red  "eyes   Ears, Nose, Mouth, Throat: no tinnitus or hearing change, no epistaxis or nasal discharge, no oral lesions, throat clear   Cardiovascular: no chest pain, palpitations, or pain with walking, no orthopnea or PND   Respiratory: no dyspnea, cough, shortness of breath or wheezing   GI: no nausea, vomiting, diarrhea or constipation, no abdominal pain   : no change in urine, no dysuria or hematuria  Musculoskeletal: no joint or muscle pain or swelling   Integumentary: no concerning lesions or moles   Neuro: no loss of strength or sensation, no numbness or tingling, no tremor, no dizziness, no headache   Endo: no polyuria or polydipsia, no temperature intolerance   Heme/Lymph: no concerning bumps, no bleeding problems   Allergy: no environmental allergies   Psych: he is still taking lorazepam bid to tid under the care of a psychiatrist.  Marital and family stress at this time.       PHYSICAL EXAM:  /79  Pulse 79  Ht 1.88 m (6' 2\")  Wt 75.4 kg (166 lb 3.2 oz)  SpO2 99%  BMI 21.34 kg/m2    Wt Readings from Last 1 Encounters:   12/14/17 75.4 kg (166 lb 3.2 oz)       Constitutional: no distress, comfortable, pleasant.   Eyes: anicteric.  Ears, Nose and Throat: tympanic membranes clear, throat clear.  Cardiovascular: regular rate and rhythm, normal S1 and S2, no murmurs, rubs or gallops, peripheral pulses full and symmetric   Respiratory: clear to auscultation, no wheezes or crackles, normal breath sounds   Gastrointestinal: positive bowel sounds, nontender, no hepatosplenomegaly, no masses   Musculoskeletal: full range of motion, no edema   Skin: no concerning lesions, no jaundice   Neurological:n grossly normal, no tremor   Psychological: appropriate mood   Lymphatic: no cervical, supra or infra clavicular, axillary or inguinal lymphadenopathy      A/P:    The patient with   Patient Active Problem List   Diagnosis     Scapulothoracic syndrome     Gastroesophageal reflux disease without esophagitis    " presents prior to surgery for assessment of perioperative risk. The patient is at LOW   risk for cardiovascular complications and at LOW   risk for pulmonary complications of this LOW   risk surgery.      --Approval given to proceed with proposed procedure, without further diagnostic evaluation    ASA class 1 - Healthy patient, no medical problems  No evidence of functionally compromising cardiac or pulmonary status  Clear for anesthesia and surgery  The patient is recommended to hold aspirin or NSAIDS for 10 days prior to surgery.  The patient is instructed as to which medications to take with sips of water the morning of surgery    Pre-Op Plan:   Proceed with surgery as planned.  No food for 8 hours before surgery.  No liquid for 2 hours before surgery.   Call surgeon  If you develop a fever, respiratory illness or other symptoms.  Hold the following medications the morning of  Surgery:  Take the following medications the morning of surgery with a sip of water:    Letter sent to requesting surgeon  listed above  Written pre-operative instructions given.    Laboratory studies:  CMP and CBC normal.    Cardiovascular: EKG : no acute changes and no  presence of ST-segment elevation in lead III exceeding that in lead II, and no  ST-depression in leads I and aVL.    Total time spent 25 minutes.  More than 50% of the time spent with Mr. Baldwin on counseling / coordinating his care      Please contact our office if there are any further questions or information required about this patient.      Mara WARD, CNP    Answers for HPI/ROS submitted by the patient on 11/30/2017   General Symptoms: No  Skin Symptoms: No  HENT Symptoms: No  EYE SYMPTOMS: No  HEART SYMPTOMS: No  LUNG SYMPTOMS: No  INTESTINAL SYMPTOMS: No  URINARY SYMPTOMS: No  REPRODUCTIVE SYMPTOMS: No  SKELETAL SYMPTOMS: No  BLOOD SYMPTOMS: No  NERVOUS SYSTEM SYMPTOMS: No  MENTAL HEALTH SYMPTOMS: Yes  Nervous or Anxious: Yes  Depression: Yes

## 2018-01-09 ENCOUNTER — TELEPHONE (OUTPATIENT)
Dept: NEUROLOGY | Facility: CLINIC | Age: 58
End: 2018-01-09

## 2018-01-09 NOTE — TELEPHONE ENCOUNTER
"Pt called clinic to inform Dr. Craig team that he is send over some \"very time sensitive\" paperwork for insurance. He states it is called a [facility] Attending form. He states Dr. Marie fills this out yearly but would like to give the clinic a \"heads up\" that he is putting it in the mail today. Pt asked this message be added into his chart as well as notifying PIETRO Wright for Neurology. Pt had no questions or concerns at this time.  Ramila Damon CMA      "

## 2018-01-15 NOTE — TELEPHONE ENCOUNTER
Form has been completed to the best of my ability. Previous form and LOV notes were also printed and placed with paperwork. Forms were put in Dr. Marie Review folder. Once signed by provider we need to copy form and send to scanning.   Myrna Luna CMA

## 2018-01-31 NOTE — TELEPHONE ENCOUNTER
Working with Chikis from [facility] and she has her staff looking out for this document. She is hoping that if it was sent to scanning that it should hopefully be there by Mon. The staff are going to be working overtime to get all records up to date with scanning over this next weekend. Will wait until Monday to see if form gets scanned into chart.  Myrna Luna, CMA

## 2018-05-08 ENCOUNTER — OFFICE VISIT (OUTPATIENT)
Dept: PLASTIC SURGERY | Facility: CLINIC | Age: 58
End: 2018-05-08
Payer: COMMERCIAL

## 2018-05-08 DIAGNOSIS — F64.0 GENDER DYSPHORIA IN ADOLESCENT AND ADULT: Primary | ICD-10-CM

## 2018-05-08 NOTE — LETTER
"2018       RE: Prasanna Baldwin  [address]  [place] [address]     Dear Colleague,    Thank you for referring your patient, Prasanna Baldwin, to the [facility] PLASTIC AND RECONSTRUCTIVE SURGERY at [facility]. Please see a copy of my visit note below.    PLASTICS NEW   HPI: This is a 58 year old male with a history of Klinefelters syndrome who presents today for a consultation about his gender dysphoria. At this time prefers pronouns he/him and preferred name is Prasanna, although he envisions transitioning to female, with a chosen name \"Tanner.\" He has felt female since the age of [age], but does not yet feel comfortable living in his chosen gender role. He is not interested in voice therapy at this time. He is currently interested in undergoing a tracheal shave and orchiectomy. His testosterone is currently in the normal range, and he hopes that the orchiectomy would decrease the testosterone and have feminizing effects. He prefers to work with only female doctors. His therapist is Carmela Greene and psychiatrist is Yee Case at [facility]. Both are supportive and willing to give a diagnosis of gender dysphoria and a letter of support. Shared pictures of himself dressing as \"Tanner\".    He put an offer on a house in [place], and intends to move there as his divorce ends. He thinks this may be a good time to transition.     Medical Hx: No asthma, diabetes, GERD. Patient has had some prostate problems. He is currently taking Trazadone, Lorazepam, Cymbalta for anxiety and depression. He thinks that the anxiety and depression are more situational related to the divorce. Could be considered to have gender dysphoria.     Surgical Hx: Right rotator cuff surgery. Laparoscopic cholecystectomy   Family Hx: Father: diabetes and dementia, pancreatic cancer, prostate cancer. Mother  of RA. Paternal uncle had testicular cancer.   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 For exercise he lifts weights and runs. Has two adopted sons from [place], ages 24 and 17 and has a close relationship with them.  Diet: Lots of water, well balanced healthy diet. Current divorce proceedings are a huge stressor.    Smokes a pipe once per day. Social alcohol use.     PE: General: Testicles descended bilaterally, walnut sized. External genitalia appeared within normal limits. The remainder of the exam was deferred.     A&P:  We will help to find Prasanna a female ENT to do the tracheal shave and a female urologist to do the orchiectomy. We discussed that he will need a diagnosis of gender dysphoria with a letter of support from his therapist. Eventually he is interested in bottom and top surgery. We will explore female surgeon options in these areas as well.     Total time = 60 minutes, over half of which spent discussing surgical options     This note was prepared on behalf of Dr. Costa by Lexy Parry, a trained medical scribe, based on the provider's statements to me.            Again, thank you for allowing me to participate in the care of your patient.      Sincerely,    Shelby Costa MD

## 2018-05-08 NOTE — MR AVS SNAPSHOT
After Visit Summary   5/8/2018    Prasanna Baldwin    MRN: [id number]           Patient Information     Date Of Birth          1960        Visit Information        Provider Department      5/8/2018 1:00 PM Shelby Costa MD [facility] Plastic and Reconstructive Surgery        Today's Diagnoses     Gender dysphoria in adolescent and adult    -  1       Follow-ups after your visit        Your next 10 appointments already scheduled     Jun 28, 2018  1:15 PM CDT   (Arrive by 1:00 PM)   PHYSICAL with SYLVIA Flores CNP   [facility] ([facility])    [address]  [address] [facility] [address] [phone]            Jul 17, 2018 11:00 AM CDT   Return Visit with Dain Marie MD   [facility] ([facility])    [address] [place] [address] [phone]              Who to contact     Please call your clinic at [phone] to:    Ask questions about your health    Make or cancel appointments    Discuss your medicines    Learn about your test results    Speak to your doctor            Additional Information About Your Visit        MyChart Information     [employer] gives you secure access to your electronic health record. If you see a primary care provider, you can also send messages to your care team and make appointments. If you have questions, please call your primary care clinic.  If you do not have a primary care provider, please call [phone] and they will assist you.      [employer] is an electronic gateway that provides easy, online access to your medical records. With [employer], you can request a clinic appointment, read your test results, renew a prescription or communicate with your care team.     To access your existing account, please contact your [facility] or call [phone] for assistance.        Care EveryWhere ID     This is your Care  EveryWhere ID. This could be used by other organizations to access your [place] medical records  [id number]         Blood Pressure from Last 3 Encounters:   12/14/17 121/79   06/29/17 108/75   06/27/17 104/63    Weight from Last 3 Encounters:   12/14/17 166 lb 3.2 oz   06/29/17 166 lb 3.2 oz   06/27/17 165 lb              Today, you had the following     No orders found for display       Primary Care Provider Office Phone # Fax #    Mara Beard, APRN [phone]       [address] [place] [address] [address]  [facility] [address]        Equal Access to Services     JOSE WHITTAKER : Hadii gerry orozcoo Soangeli, waaxda luqadaha, qaybta kaalmada adenusratyada, rafat domingo . So [facility] [phone].    ATENCIÓN: Si habla español, tiene a vickers disposición servicios gratuitos de asistencia lingüística. [facility] [phone].    We comply with applicable federal civil rights laws and [place] laws. We do not discriminate on the basis of race, color, national origin, age, disability, sex, sexual orientation, or gender identity.            Thank you!     [facility] you [facility]  for your care. Our goal is always to provide you with excellent care. Hearing back from our patients is one way we can continue to improve our services. Please take a few minutes to complete the written survey that you may receive in the mail after your visit with us. Thank you!             Your Updated Medication List - Protect others around you: Learn how to safely use, store and throw away your medicines at www.disposemymeds.org.          This list is accurate as of 5/8/18 11:59 PM.  Always use your most recent med list.                   Brand Name Dispense Instructions for use Diagnosis    calcium carbonate 500 MG chewable tablet    TUMS     Take 1 chew tab by mouth once a week        CYMBALTA PO      Take by mouth daily        DAILY MULTIVITAMIN PO      Take  by mouth.         LORAZEPAM PO      Take 2 mg by mouth 3 times daily        traZODone 100 MG tablet    DESYREL     Take 100 mg by mouth At Bedtime

## 2018-05-08 NOTE — PROGRESS NOTES
"PLASTICS NEW   HPI: This is a 58 year old male with a history of Klinefelters syndrome who presents today for a consultation about his gender dysphoria. At this time prefers pronouns he/him and preferred name is Prasanna, although he envisions transitioning to female, with a chosen name \"Tanner.\" He has felt female since the age of [age], but does not yet feel comfortable living in his chosen gender role. He is not interested in voice therapy at this time. He is currently interested in undergoing a tracheal shave and orchiectomy. His testosterone is currently in the normal range, and he hopes that the orchiectomy would decrease the testosterone and have feminizing effects. He prefers to work with only female doctors. His therapist is Carmela Greene and psychiatrist is Yee Case at [facility]. Both are supportive and willing to give a diagnosis of gender dysphoria and a letter of support. Shared pictures of himself dressing as \"Tanner\".    He put an offer on a house in [place], and intends to move there as his divorce ends. He thinks this may be a good time to transition.     Medical Hx: No asthma, diabetes, GERD. Patient has had some prostate problems. He is currently taking Trazadone, Lorazepam, Cymbalta for anxiety and depression. He thinks that the anxiety and depression are more situational related to the divorce. Could be considered to have gender dysphoria.     Surgical Hx: Right rotator cuff surgery. Laparoscopic cholecystectomy   Family Hx: Father: diabetes and dementia, pancreatic cancer, prostate cancer. Mother  of RA. Paternal uncle had testicular cancer.   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 For exercise he lifts weights and runs. Has two adopted sons from [place], ages 24 and 17 and has a close relationship with them.  Diet: Lots of water, well balanced healthy diet. Current divorce proceedings are a huge stressor.    Smokes " a pipe once per day. Social alcohol use.     PE: General: Testicles descended bilaterally, walnut sized. External genitalia appeared within normal limits. The remainder of the exam was deferred.     A&P:  We will help to find Prasanna a female ENT to do the tracheal shave and a female urologist to do the orchiectomy. We discussed that he will need a diagnosis of gender dysphoria with a letter of support from his therapist. Eventually he is interested in bottom and top surgery. We will explore female surgeon options in these areas as well.     Total time = 60 minutes, over half of which spent discussing surgical options     This note was prepared on behalf of Dr. Costa by Lexy Parry, a trained medical scribe, based on the provider's statements to me.

## 2018-06-28 ENCOUNTER — OFFICE VISIT (OUTPATIENT)
Dept: INTERNAL MEDICINE | Facility: CLINIC | Age: 58
End: 2018-06-28
Payer: COMMERCIAL

## 2018-06-28 VITALS
DIASTOLIC BLOOD PRESSURE: 86 MMHG | HEIGHT: 74 IN | HEART RATE: 83 BPM | BODY MASS INDEX: 22.46 KG/M2 | WEIGHT: 175 LBS | SYSTOLIC BLOOD PRESSURE: 125 MMHG

## 2018-06-28 DIAGNOSIS — Q98.4 KLINEFELTER'S SYNDROME: ICD-10-CM

## 2018-06-28 ASSESSMENT — ENCOUNTER SYMPTOMS
FEVER: 0
DYSURIA: 0
HEMATURIA: 0
NIGHT SWEATS: 0
DIFFICULTY URINATING: 1
POLYDIPSIA: 0
WEIGHT GAIN: 0
FATIGUE: 0
HALLUCINATIONS: 0
DECREASED APPETITE: 1
POLYPHAGIA: 0
INCREASED ENERGY: 0
FLANK PAIN: 0
CHILLS: 0
WEIGHT LOSS: 1

## 2018-06-28 ASSESSMENT — PAIN SCALES - GENERAL: PAINLEVEL: NO PAIN (0)

## 2018-06-28 NOTE — PROGRESS NOTES
[facility]  Primary Care Center   Mara Beard, APRN CNP  06/28/2018      Chief Complaint:   No chief complaint on file.       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  He met with Dr. Costa in surgery  Department 5/8/2018 to discuss future surgeries as he transitions to female.  He intends to start the process after his youngest son graduates from high school next summer.  He will then take hormones for one year before starting surgeries.  He has established a surgeon out [facility] and here  his therapist is Carmela Greene and psychiatrist is Yee Case at [facility]. He feels relief in making this decision although he has not confided in his wife regarding his plans and states he will do this after his divorce.  His older son is ahumada and has a partner and is doing much better, holding down a job.  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  He has decided to live in [place]. He is currently living in the basement of their home.     He is running again since he has gained back some weight.     He is not having any new problems today.    Patient Active Problem List   Diagnosis     Scapulothoracic syndrome     Gastroesophageal reflux disease without esophagitis     Klinefelter's syndrome diagnosed 12/1980     Family History   Problem Relation Age of Onset     Rheumatoid Arthritis Mother      Type 2 Diabetes Father      Dementia Father      Abdominal Aortic Aneurysm Father      Pancreatic Cancer Father      Prostate Cancer Father      Cancer Paternal Uncle      testicular       Review of Systems:   Pertinent items are noted in HPI, remainder of complete ROS is negative.      Active Medications:     Current Outpatient Prescriptions:      calcium carbonate (TUMS) 500 MG chewable tablet, Take 1 chew tab by mouth once a week , Disp: , Rfl:      DULoxetine HCl (CYMBALTA  PO), Take by mouth daily, Disp: , Rfl: Prescribed by psychiatry     LORAZEPAM PO, Take 2 mg by mouth 3 times daily, Disp: , Rfl: prescribed by psychiatry     Multiple Vitamin (DAILY MULTIVITAMIN PO), Take  by mouth., Disp: , Rfl:      traZODone (DESYREL) 100 MG tablet, Take 100 mg by mouth At Bedtime, Disp: , Rfl:  Prescribed by psychiatry     Allergies:   Nkda [no known drug allergies]      Past Medical History:  Right rib fracture  Scapulothoracic syndrome  gastroesophageal reflux disease      Past Surgical History:  ACL repair  Cholecystectomy    Social History:     Pipe tobacco smoker    Physical Exam:   There were no vitals taken for this visit.   General: no distress.   Eyes: no icterus  Heart: nl S1S2. Nl exam.   Lungs: clear A and P  MS: ambulatory.    Assessment and Plan:  Gender dysphoria        Follow-up: He will return for pre-ops and primary care as needed.        Scribe Disclosure:   I, Asa Randall, am serving as a scribe to document services personally performed by SYLVIA Rodas CNP at this visit, based upon the provider's statements to me. All documentation has been reviewed by the aforementioned provider prior to being entered into the official medical record.     Portions of this medical record were completed by a scribe. UPON MY REVIEW AND AUTHENTICATION BY ELECTRONIC SIGNATURE, this confirms (a) I performed the applicable clinical services, and (b) the record is accurate.   Answers for HPI/ROS submitted by the patient on 6/28/2018   General Symptoms: Yes  Skin Symptoms: No  HENT Symptoms: No  EYE SYMPTOMS: No  HEART SYMPTOMS: No  LUNG SYMPTOMS: No  INTESTINAL SYMPTOMS: No  URINARY SYMPTOMS: Yes  REPRODUCTIVE SYMPTOMS: No  SKELETAL SYMPTOMS: No  BLOOD SYMPTOMS: No  NERVOUS SYSTEM SYMPTOMS: No  MENTAL HEALTH SYMPTOMS: Yes  Fever: No  Loss of appetite: Yes  Weight loss: Yes  Weight gain: No  Fatigue: No  Night sweats: No  Chills: No  Increased stress: Yes  Excessive hunger:  No  Excessive thirst: No  Loss of height: No  Post-operative complications: No  Surgical site pain: No  Hallucinations: No  Change in or Loss of Energy: No  Hyperactivity: No  Confusion: No  Trouble holding urine or incontinence: Yes  Pain or burning: No  Trouble starting or stopping: Yes  Increased frequency of urination: Yes  Blood in urine: No  Decreased frequency of urination: No  Frequent nighttime urination: Yes  Flank pain: No  Difficulty emptying bladder: Yes    Total time spent 25 minutes.  More than 50% of the time spent with Mr. Baldwin on counseling / coordinating his care.    Mara WARD, CNP

## 2018-06-28 NOTE — MR AVS SNAPSHOT
After Visit Summary   6/28/2018    Prasanna Baldwin    MRN: [id number]           Patient Information     Date Of Birth          1960        Visit Information        Provider Department      6/28/2018 1:15 PM Mara Beard APRN CNP [facility]        Today's Diagnoses     Klinefelter's syndrome diagnosed 12/1980           Follow-ups after your visit        Follow-up notes from your care team     Return in about 1 year (around 6/28/2019).      Your next 10 appointments already scheduled     Jul 17, 2018 11:00 AM CDT   Return Visit with Dain Marie MD   [facility] ([facility])    [address] [place] [address] [phone]              Who to contact     Please call your clinic at [phone] to:    Ask questions about your health    Make or cancel appointments    Discuss your medicines    Learn about your test results    Speak to your doctor            Additional Information About Your Visit        [employer] Information     [employer] gives you secure access to your electronic health record. If you see a primary care provider, you can also send messages to your care team and make appointments. If you have questions, please call your primary care clinic.  If you do not have a primary care provider, please call [phone] and they will assist you.      [employer] is an electronic gateway that provides easy, online access to your medical records. With [employer], you can request a clinic appointment, read your test results, renew a prescription or communicate with your care team.     To access your existing account, please contact your [facility] or call [phone] for assistance.        Care EveryWhere ID     This is your Care EveryWhere ID. This could be used by other organizations to access your [place] medical records  [id number]        Your Vitals Were     Pulse Height BMI (Body Mass Index)        "      83 1.88 m (6' 2\") 22.47 kg/m2          Blood Pressure from Last 3 Encounters:   06/28/18 125/86   12/14/17 121/79   06/29/17 108/75    Weight from Last 3 Encounters:   06/28/18 79.4 kg (175 lb)   12/14/17 75.4 kg (166 lb 3.2 oz)   06/29/17 75.4 kg (166 lb 3.2 oz)              Today, you had the following     No orders found for display       Primary Care Provider Office Phone # Fax #    Mara Beard, APRN [phone]       [address] [facility] [address]  [facility] [address]        Equal Access to Services     FAUSTINO WHITTAKER : Neptali De La Fuente, wakylee huynhadaha, qaybta kaalmada adenusratyada, rafat domingo . So [facility] [phone].    ATENCIÓN: Si habla español, tiene a vickers disposición servicios gratuitos de asistencia lingüística. Llame al [phone].    We comply with applicable federal civil rights laws and [place] laws. We do not discriminate on the basis of race, color, national origin, age, disability, sex, sexual orientation, or gender identity.            Thank you!     Thank you for choosing [facility] PRIMARY CARE CL[facility]IC  for your care. Our goal is always to provide you with excellent care. Hearing back from our patients is one way we can continue to improve our services. Please take a few minutes to complete the written survey that you may receive in the mail after your visit with us. Thank you!             Your Updated Medication List - Protect others around you: Learn how to safely use, store and throw away your medicines at www.disposemymeds.org.          This list is accurate as of 6/28/18  1:33 PM.  Always use your most recent med list.                   Brand Name Dispense Instructions for use Diagnosis    calcium carbonate 500 MG chewable tablet    TUMS     Take 1 chew tab by mouth once a week        CYMBALTA PO      Take 180 mg by mouth daily        DAILY MULTIVITAMIN PO      Liquid Organic        LORAZEPAM PO      Take 2 mg by mouth 3 times " daily        traZODone 100 MG tablet    DESYREL     Take 100 mg by mouth nightly as needed

## 2018-06-28 NOTE — NURSING NOTE
Chief Complaint   Patient presents with     Physical       Ivette Winkler LPN at 12:52 PM on June 28, 2018

## 2018-07-17 ENCOUNTER — OFFICE VISIT (OUTPATIENT)
Dept: NEUROLOGY | Facility: CLINIC | Age: 58
End: 2018-07-17
Payer: COMMERCIAL

## 2018-07-17 VITALS
HEART RATE: 91 BPM | DIASTOLIC BLOOD PRESSURE: 78 MMHG | WEIGHT: 176 LBS | SYSTOLIC BLOOD PRESSURE: 123 MMHG | HEIGHT: 74 IN | BODY MASS INDEX: 22.59 KG/M2

## 2018-07-17 DIAGNOSIS — G56.80 SCAPULOTHORACIC SYNDROME: Primary | ICD-10-CM

## 2018-07-17 PROCEDURE — 99213 OFFICE O/P EST LOW 20 MIN: CPT | Performed by: PSYCHIATRY & NEUROLOGY

## 2018-07-17 NOTE — MR AVS SNAPSHOT
After Visit Summary   7/17/2018    Prasanna Baldwin    MRN: [id number]           Patient Information     Date Of Birth          1960        Visit Information        Provider Department      7/17/2018 11:00 AM Dain Marie MD [facility]         Follow-ups after your visit        Your next 10 appointments already scheduled     Jul 09, 2019 11:00 AM CDT   Return Visit with Dain Marie MD   [facility] ([facility])    [address] [place] [address] [phone]              Who to contact     If you have questions or need follow up information about today's clinic visit or your schedule please contact [facility] directly at [phone].  Normal or non-critical lab and imaging results will be communicated to you by [employer], letter or phone within 4 business days after the clinic has received the results. If you do not hear from us within 7 days, please contact the clinic through [employer] or phone. If you have a critical or abnormal lab result, we will notify you by phone as soon as possible.  Submit refill requests through [employer] or call your pharmacy and they will forward the refill request to us. Please allow 3 business days for your refill to be completed.          Additional Information About Your Visit        [employer] Information     [employer] gives you secure access to your electronic health record. If you see a primary care provider, you can also send messages to your care team and make appointments. If you have questions, please call your primary care clinic.  If you do not have a primary care provider, please call [phone] and they will assist you.      [employer] is an electronic gateway that provides easy, online access to your medical records. With [employer], you can request a clinic appointment, read your test results, renew a prescription or communicate with your care team.     To access your existing  "account, please contact your [facility] or call [phone] for assistance.        Care EveryWhere ID     This is your Care EveryWhere ID. This could be used by other organizations to access your [place] medical records  [id number]        Your Vitals Were     Pulse Height BMI (Body Mass Index)             91 1.88 m (6' 2\") 22.6 kg/m2          Blood Pressure from Last 3 Encounters:   07/17/18 123/78   06/28/18 125/86   12/14/17 121/79    Weight from Last 3 Encounters:   07/17/18 79.8 kg (176 lb)   06/28/18 79.4 kg (175 lb)   12/14/17 75.4 kg (166 lb 3.2 oz)              Today, you had the following     No orders found for display       Primary Care Provider Office Phone # Fax #    SYLVIA Flores [phone]       [address] [place] [address] [address]  [facility] [address]        Equal Access to Services     FAUSTINO WHITTAKER : Hadii aad ku hadasho Soomaali, waaxda luqadaha, qaybta kaalmada adeegyada, waxay idiin hayaan adeeg kharash jose l . So [facility] [phone].    ATENCIÓN: Si habla español, tiene a vickers disposición servicios gratuitos de asistencia lingüística. Anname al [phone].    We comply with applicable federal civil rights laws and [place] laws. We do not discriminate on the basis of race, color, national origin, age, disability, sex, sexual orientation, or gender identity.            Thank you!     Thank you for choosing [facility]  for your care. Our goal is always to provide you with excellent care. Hearing back from our patients is one way we can continue to improve our services. Please take a few minutes to complete the written survey that you may receive in the mail after your visit with us. Thank you!             Your Updated Medication List - Protect others around you: Learn how to safely use, store and throw away your medicines at www.disposemymeds.org.          This list is accurate as of 7/17/18 11:38 AM.  Always use your most " recent med list.                   Brand Name Dispense Instructions for use Diagnosis    calcium carbonate 500 MG chewable tablet    TUMS     Take 1 chew tab by mouth once a week        CYMBALTA PO      Take 180 mg by mouth daily        DAILY MULTIVITAMIN PO      Liquid Organic        LORAZEPAM PO      Take 2 mg by mouth 3 times daily        traZODone 100 MG tablet    DESYREL     Take 100 mg by mouth nightly as needed

## 2018-07-17 NOTE — PROGRESS NOTES
2018            Mara Beard NP   Primary Care Center    [address],  [facility] [address]   [place] [address]      Patient:  Prasanna Irizarry   MRN:  [id number]   :  1960      Dear Mara:      I saw  Prasanna Denny in followup at the [facility] where I have seen him for management of scapulothoracic dysfunction.  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  Examination demonstrates trace scapular winging at rest.  There is moderate weakness of the right rhomboid and today he does demonstrate some difficulty with left infraspinatus function as well, although no atrophy is noted.      Dr. Irizarry's chart now indicates a diagnosis of Klinefelter syndrome.  The karyotype may not be available in the medical record.  We discussed the possibility that there is a relationship between this and scapulothoracic dysfunction; I will review the literature in this regard.      Dr. Irizarry will return in 1 year.         Sincerely,      ANA DENNIS MD             D: 2018   T: 2018   MT: NTS      Name:     PRASANNA IRIZARRY   MRN:      [id number]        Account:      [id number]   :      1960      Document: [id number]       cc: Mara Beard NP

## 2018-07-17 NOTE — NURSING NOTE
"Prasanna Baldwin's goals for this visit include:   Chief Complaint   Patient presents with     RECHECK   He requests these members of his care team be copied on today's visit information: PCP    PCP: Mara Beard    Referring Provider:  Referred Self, MD  No address on file    /78 (BP Location: Left arm, Patient Position: Chair, Cuff Size: Adult Regular)  Pulse 91  Ht 1.88 m (6' 2\")  Wt 79.8 kg (176 lb)  BMI 22.6 kg/m2    Do you need any medication refills at today's visit? n  "

## 2018-08-31 ENCOUNTER — MYC MEDICAL ADVICE (OUTPATIENT)
Dept: INTERNAL MEDICINE | Facility: CLINIC | Age: 58
End: 2018-08-31

## 2018-08-31 DIAGNOSIS — Z78.9 TRANSGENDER: Primary | ICD-10-CM

## 2018-09-04 NOTE — TELEPHONE ENCOUNTER
I got the message from endocrinology that all of the transgender pts are being seen by the center for the sexual health now. Can we refer him to the center for sexual health ?    Soon-Mi

## 2019-01-21 ENCOUNTER — DOCUMENTATION ONLY (OUTPATIENT)
Dept: NEUROLOGY | Facility: CLINIC | Age: 59
End: 2019-01-21

## 2019-01-21 NOTE — PROGRESS NOTES
[facility] disability forms were received. They have been completed and placed in Dr Marie's folder to review and sign.  Adriana Polk RN

## 2019-01-22 NOTE — PROGRESS NOTES
Forms signed and faxed. The pt was informed and a copy was sent to scanning and original mailed to the pt's home.  Adriana Polk RN

## 2019-06-18 ENCOUNTER — OFFICE VISIT (OUTPATIENT)
Dept: NEUROLOGY | Facility: CLINIC | Age: 59
End: 2019-06-18
Payer: COMMERCIAL

## 2019-06-18 VITALS
BODY MASS INDEX: 22.78 KG/M2 | DIASTOLIC BLOOD PRESSURE: 84 MMHG | OXYGEN SATURATION: 100 % | HEIGHT: 74 IN | HEART RATE: 91 BPM | SYSTOLIC BLOOD PRESSURE: 127 MMHG | WEIGHT: 177.5 LBS

## 2019-06-18 DIAGNOSIS — G56.80 SCAPULOTHORACIC SYNDROME: Primary | ICD-10-CM

## 2019-06-18 PROCEDURE — 99212 OFFICE O/P EST SF 10 MIN: CPT | Performed by: PSYCHIATRY & NEUROLOGY

## 2019-06-18 RX ORDER — SPIRONOLACTONE 50 MG/1
50 TABLET, FILM COATED ORAL DAILY
Refills: 0 | COMMUNITY
Start: 2019-04-17 | End: 2020-11-01

## 2019-06-18 RX ORDER — ESTRADIOL 2 MG/1
4 TABLET ORAL DAILY
Refills: 0 | COMMUNITY
Start: 2019-04-13 | End: 2022-08-09

## 2019-06-18 ASSESSMENT — MIFFLIN-ST. JEOR: SCORE: 1684.88

## 2019-06-18 ASSESSMENT — PAIN SCALES - GENERAL: PAINLEVEL: NO PAIN (0)

## 2019-06-18 NOTE — LETTER
2019         RE: Prasanna Irizarry  [address]  [facility] [address]        Dear Colleague,    Thank you for referring your patient, Prasanna Irizarry, to the [facility]. Please see a copy of my visit note below.    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  Dr. Irizarry reports no meaningful change in the symptoms related to this diagnosis.      A directed examination again demonstrates winging of the right scapula which is most evident with a gradual lowering of the outstretched upper limbs from an elevated position.  Winging is evident in the mid part of this maneuver.  Manual muscle testing of shoulder girdle muscles demonstrates inability to abduct the hand from the lower back when flexed at the elbow on the right only.  Manual muscle testing of supraspinatus, infraspinatus and trapezius biceps and triceps demonstrates full strength.      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  He will return in 1 year.         Sincerely,      ANA DENNIS MD             D: 2019   T: 2019   MT:       Name:     PRASANNA IRIZARRY   MRN:      [id number]        Account:      [id number]   :      1960      Document: [id number]       cc: Mara Beard NP       Again, thank you for allowing me to participate in the care of your patient.        Sincerely,        Ana Dennis MD

## 2019-06-18 NOTE — NURSING NOTE
"Prasanna Baldwin's goals for this visit include: return  He requests these members of his care team be copied on today's visit information:     PCP: Mara Beard    Referring Provider:  Referred Self, MD  No address on file    /84   Pulse 91   Ht 1.88 m (6' 2\")   Wt 80.5 kg (177 lb 8 oz)   SpO2 100%   BMI 22.79 kg/m      Do you need any medication refills at today's visit? n  "

## 2019-06-18 NOTE — PROGRESS NOTES
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  Dr. Irizarry reports no meaningful change in the symptoms related to this diagnosis.      A directed examination again demonstrates winging of the right scapula which is most evident with a gradual lowering of the outstretched upper limbs from an elevated position.  Winging is evident in the mid part of this maneuver.  Manual muscle testing of shoulder girdle muscles demonstrates inability to abduct the hand from the lower back when flexed at the elbow on the right only.  Manual muscle testing of supraspinatus, infraspinatus and trapezius biceps and triceps demonstrates full strength.      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  He will return in 1 year.         Sincerely,      ANA DENNIS MD             D: 2019   T: 2019   MT:       Name:     PRASANNA IRIZARRY   MRN:      [id number]        Account:      [id number]   :      1960      Document: [id number]       cc: Mara Beard NP

## 2019-06-28 DIAGNOSIS — H61.23 BILATERAL IMPACTED CERUMEN: Primary | ICD-10-CM

## 2019-07-23 ENCOUNTER — TRANSFERRED RECORDS (OUTPATIENT)
Dept: HEALTH INFORMATION MANAGEMENT | Facility: CLINIC | Age: 59
End: 2019-07-23

## 2019-07-30 ENCOUNTER — DOCUMENTATION ONLY (OUTPATIENT)
Dept: CARE COORDINATION | Facility: CLINIC | Age: 59
End: 2019-07-30

## 2019-08-06 ENCOUNTER — TRANSFERRED RECORDS (OUTPATIENT)
Dept: HEALTH INFORMATION MANAGEMENT | Facility: CLINIC | Age: 59
End: 2019-08-06

## 2019-08-22 ENCOUNTER — OFFICE VISIT (OUTPATIENT)
Dept: INTERNAL MEDICINE | Facility: CLINIC | Age: 59
End: 2019-08-22
Payer: COMMERCIAL

## 2019-08-22 VITALS
RESPIRATION RATE: 16 BRPM | DIASTOLIC BLOOD PRESSURE: 100 MMHG | BODY MASS INDEX: 22.82 KG/M2 | WEIGHT: 177.7 LBS | HEART RATE: 101 BPM | SYSTOLIC BLOOD PRESSURE: 144 MMHG | OXYGEN SATURATION: 95 %

## 2019-08-22 DIAGNOSIS — F64.0 GENDER DYSPHORIA IN ADULT: ICD-10-CM

## 2019-08-22 DIAGNOSIS — F64.0 GENDER DYSPHORIA IN ADULT: Primary | ICD-10-CM

## 2019-08-22 LAB
ESTRADIOL SERPL-MCNC: 74 PG/ML (ref 6–50)
HBA1C MFR BLD: 5.9 % (ref 0–5.6)
PROGEST SERPL-MCNC: 4.6 NG/ML (ref 0.3–1.2)

## 2019-08-22 ASSESSMENT — PAIN SCALES - GENERAL: PAINLEVEL: NO PAIN (0)

## 2019-08-22 ASSESSMENT — ENCOUNTER SYMPTOMS
PANIC: 1
NERVOUS/ANXIOUS: 0
HOARSE VOICE: 0
TROUBLE SWALLOWING: 0
TASTE DISTURBANCE: 1
INSOMNIA: 0
DECREASED CONCENTRATION: 1
SORE THROAT: 0
NECK MASS: 0
SINUS PAIN: 0
SMELL DISTURBANCE: 1
SINUS CONGESTION: 0
DEPRESSION: 0

## 2019-08-22 NOTE — NURSING NOTE
Chief Complaint   Patient presents with     Physical     Results     Blood tests of estrogen, testerone, progesterone     Diabetes     A1C test per patient       Felicitas Quinn, EMT

## 2019-08-22 NOTE — PROGRESS NOTES
"[facility] [place]   Mara Beard, SYLVIA CNP  08/22/2019      Chief Complaint:   Physical; Results; and Diabetes     History of Present Illness:   Prasanna (now goes by Carmela) WILLOW Baldwin is a 59 year old male with a history of Klinefelter's syndrome, gastroesophageal reflux disease (GERD), and scapulothoracic syndrome who presents for a physical and for labs. The patient is transitioning from male to female. She is recently , has bought  a new house, is looking to buy a house in [place]  and states she and her sons are happier.Youngest son is living with him and his mother and planning to attend [employer] this Fall.  She is hopefully moving to [place], [place] next fall. She has been on hormone therapy for two months and takes estradiol and progesterone, and spironolactone daily.  Will be discontinuing spironolactone in the near future. She declares the first day of her hormone therapy, [birth year] her new [birth year].  She feels she is able to think more clearly. It can be overwhelming, but she feels she has happier.  The hormones are making her feel a lot better in general. Dr. Ryan at [facility] is managing her hormone therapy. She endorses weight gain since starting the hormones. She has a mammogram scheduled for two months from now. Today she would like hormone labs and a physical with breast examination. Her transition surgery will be in [place], [place] next year.. She would like hormone labs and A1c checked today. She reports monthly \"period symptoms,\" including bloated abdomen and  Bleeding.demonstrated by blood in her underware, and increased bleeding from scratches.   She has been trying to wear feminine products and finds blood on them. She denies blood in her urine.    Other concerns discussed:  1. Just had teeth cleaned at dentist  2. The last time she ran, she experienced right hip pain,  felt \"like nerve was getting in the way.\" She has been resting for a while " now and plans to try again soon.     Review of Systems:   Pertinent items are noted in HPI or as in patient entered ROS below, remainder of complete ROS is negative.    Answers for HPI/ROS submitted by the patient on 8/22/2019   General Symptoms: No  Skin Symptoms: No  HENT Symptoms: Yes  EYE SYMPTOMS: No  HEART SYMPTOMS: No  LUNG SYMPTOMS: No  INTESTINAL SYMPTOMS: No  URINARY SYMPTOMS: No  REPRODUCTIVE SYMPTOMS: Yes  SKELETAL SYMPTOMS: No  BLOOD SYMPTOMS: No  NERVOUS SYSTEM SYMPTOMS: No  MENTAL HEALTH SYMPTOMS: Yes  Ear pain: No  Ear discharge: No  Hearing loss: No  Tinnitus: No  Nosebleeds: No  Congestion: No  Sinus pain: No  Trouble swallowing: No   Voice hoarseness: No  Mouth sores: No  Sore throat: No  Tooth pain: No  Gum tenderness: No  Bleeding gums: No  Change in taste: Yes  Change in sense of smell: Yes  Dry mouth: No  Hearing aid used: No  Neck lump: No  Scrotal pain or swelling: No  Erectile dysfunction: Yes  Penile discharge: No  Genital ulcers: No  Reduced libido: Yes  Nervous or Anxious: No  Depression: No  Trouble sleeping: No  Trouble thinking or concentrating: Yes  Mood changes: Yes  Panic attacks: Yes       Active Medications:      carbamide peroxide (DEBROX) 6.5 % otic solution, Place 5 drops into both ears 2 times daily, Disp: 14.8 mL, Rfl: 3     DULoxetine HCl (CYMBALTA PO), Take 180 mg by mouth daily , Disp: , Rfl:      estradiol (ESTRACE) 2 MG tablet, Take 2 mg by mouth 2 times daily as needed , Disp: , Rfl: 0     LORAZEPAM PO, Take 2 mg by mouth 3 times daily as needed , Disp: , Rfl:      Multiple Vitamin (DAILY MULTIVITAMIN PO), Liquid Organic, Disp: , Rfl:      spironolactone (ALDACTONE) 50 MG tablet, Take 100 mg by mouth 2 times daily as needed , Disp: , Rfl: 0     traZODone (DESYREL) 100 MG tablet, Take 100 mg by mouth nightly as needed , Disp: , Rfl:      calcium carbonate (TUMS) 500 MG chewable tablet, Take 1 chew tab by mouth once a week , Disp: , Rfl:      progesterone (PROMETRIUM)  100 MG capsule, Take 100 mg by mouth 2 times daily as needed, Disp: , Rfl: 1      Allergies:   Nkda [no known drug allergies]      Past Medical History:  Right rib fracture  Scapulothoracic syndrome  Gastroesophageal reflux disease (GERD) without esophagitis  Klinefelter's syndrome     Past Surgical History:  Arthroscopic reconstruction anterior cruciate ligament - 08/01/2001  Cholecystectomy - 08/23/2006    Family History:   Rheumatoid arthritis - mother  Type II diabetes - father  Dementia - father  Abdominal aortic aneurysm - father  Pancreatic cancer - father  Prostate cancer - father  Testicular cancer - paternal uncle      Social History:   The patient is  with two sons, a light pipe tobacco smoker (1 pipe/day), and occasionally consumes alcohol.      Physical Exam:   BP (!) 144/100 (BP Location: Right arm, Patient Position: Sitting, Cuff Size: Adult Regular)   Pulse 101   Resp 16   Wt 80.6 kg (177 lb 11.2 oz)   SpO2 95%   BMI 22.82 kg/m     Wt Readings from Last 1 Encounters:   08/22/19 80.6 kg (177 lb 11.2 oz)     Constitutional: no distress, comfortable, pleasant   Eyes: anicteric, conjunctiva pink.  Ears, Nose and Throat: tympanic membranes clear,  throat clear.   Neck: supple with full range of motion, no thyromegaly.   Cardiovascular: regular rate and rhythm, normal S1 and S2, no murmurs, rubs or gallops, peripheral pulses full and symmetric   Respiratory: clear to auscultation, no wheezes or crackles, normal breath sounds   Gastrointestinal: positive bowel sounds, nontender, no hepatosplenomegaly, no masses   Musculoskeletal: full range of motion, no edema   Breasts: right breast infra-aveolar fullness, left breast with no masses. Breast exam technique taught.  Skin: no concerning lesions, no jaundice, temp normal   Neurological: normal speech, no tremor. A and O x 3, good historian.  Psychological: appropriate mood, good eye contact, normal affect   Lymph: no cervical,  supraclavicular,  infraclavicular or inguinal nodes.        Assessment and Plan:  Gender dysphoria in adult  Ordered labs as patient requested to monitor hormone levels after starting feminine hormones at the beginning of June.   - Estradiol  - Progesterone  - Testosterone Free and Total  - Hemoglobin A1c       Follow-up: results will be sent to patient when available.      Total time spent 40 minutes due to patient explaining symptoms.  More than 50% of the time spent with Mr. Baldwin on counseling / coordinating his care.    Mara WARD CNP       Scribe Disclosure:  I, Sharon Deluna, am serving as a scribe to document services personally performed by SYLVIA Rodas CNP at this visit, based upon the provider's statements to me. All documentation has been reviewed by the aforementioned provider prior to being entered into the official medical record.     Portions of this medical record were completed by a scribe. UPON MY REVIEW AND AUTHENTICATION BY ELECTRONIC SIGNATURE, this confirms (a) I performed the applicable clinical services, and (b) the record is accurate.

## 2019-08-22 NOTE — PROGRESS NOTES
Answers for HPI/ROS submitted by the patient on 8/22/2019   General Symptoms: No  Skin Symptoms: No  HENT Symptoms: Yes  EYE SYMPTOMS: No  HEART SYMPTOMS: No  LUNG SYMPTOMS: No  INTESTINAL SYMPTOMS: No  URINARY SYMPTOMS: No  REPRODUCTIVE SYMPTOMS: Yes  SKELETAL SYMPTOMS: No  BLOOD SYMPTOMS: No  NERVOUS SYSTEM SYMPTOMS: No  MENTAL HEALTH SYMPTOMS: Yes  Ear pain: No  Ear discharge: No  Hearing loss: No  Tinnitus: No  Nosebleeds: No  Congestion: No  Sinus pain: No  Trouble swallowing: No   Voice hoarseness: No  Mouth sores: No  Sore throat: No  Tooth pain: No  Gum tenderness: No  Bleeding gums: No  Change in taste: Yes  Change in sense of smell: Yes  Dry mouth: No  Hearing aid used: No  Neck lump: No  Scrotal pain or swelling: No  Erectile dysfunction: Yes  Penile discharge: No  Genital ulcers: No  Reduced libido: Yes  Nervous or Anxious: No  Depression: No  Trouble sleeping: No  Trouble thinking or concentrating: Yes  Mood changes: Yes  Panic attacks: Yes

## 2019-08-22 NOTE — PATIENT INSTRUCTIONS
Primary Care Center Medication Refill Request Information:  * Please contact your pharmacy regarding ANY request for medication refills.  ** [facility] Prescription Fax = [phone]  * Please allow 3 business days for routine medication refills.  * Please allow 5 business days for controlled substance medication refills.     Primary Care Center Test Result notification information:  *You will be notified with in 7-10 days of your appointment day regarding the results of your test.  If you are on MyChart you will be notified as soon as the provider has reviewed the results and signed off on them.

## 2019-08-26 ENCOUNTER — TELEPHONE (OUTPATIENT)
Dept: INTERNAL MEDICINE | Facility: CLINIC | Age: 59
End: 2019-08-26

## 2019-08-26 NOTE — LETTER
Patient:  Prasanna Baldwin  :   1960  MRN:     [id number]        [address]. Prasanna Baldwin  [address] [facility] [address]        2019    Dear Via Carmela,    Results for orders placed or performed in visit on 19   Hemoglobin A1c   Result Value Ref Range    Hemoglobin A1C 5.9 (H) 0 - 5.6 %   Testosterone Free and Total   Result Value Ref Range    Testosterone Total PENDING 240 - 950 ng/dL    Sex Hormone Binding Globulin 131 (H) 11 - 80 nmol/L    Free Testosterone Calculated PENDING 4.7 - 24.4 ng/dL   Estradiol   Result Value Ref Range    Estradiol 74 (H) 6 - 50 pg/mL     Progesterone 4.6  High   0.3 - 1.2        Via Carmela,  Your hormone results are above.   Your hemoglobin A1C is slightly elevated  This will be monitored.   It was so great to see you Thursday.     Mara WARD, CNP

## 2019-08-26 NOTE — TELEPHONE ENCOUNTER
I spoke with Alma Casey today, reviewed recent results. Informed her we are still awaiting the testosterone results. We discussed elevated hemoglobin A1c, recommendations to increase physical activity, limit carbs. Alma Casey voiced understanding, stated she has been eating 2 apple fritters daily lately, though knows she needs to cut back.    Alma Casey would like results e-mailed to her as well as mailed. She voiced understading that e-mail might not be secure. Results e-mailed and mailed as requested.    Cee Pena RN

## 2019-08-27 LAB
SHBG SERPL-SCNC: 131 NMOL/L (ref 11–80)
TESTOST FREE SERPL-MCNC: 0.06 NG/DL (ref 4.7–24.4)
TESTOST SERPL-MCNC: 15 NG/DL (ref 240–950)

## 2019-12-10 ENCOUNTER — OFFICE VISIT (OUTPATIENT)
Dept: INTERNAL MEDICINE | Facility: CLINIC | Age: 59
End: 2019-12-10
Payer: COMMERCIAL

## 2019-12-10 ENCOUNTER — TELEPHONE (OUTPATIENT)
Dept: INTERNAL MEDICINE | Facility: CLINIC | Age: 59
End: 2019-12-10

## 2019-12-10 VITALS
SYSTOLIC BLOOD PRESSURE: 159 MMHG | HEART RATE: 100 BPM | WEIGHT: 188 LBS | BODY MASS INDEX: 24.14 KG/M2 | OXYGEN SATURATION: 100 % | DIASTOLIC BLOOD PRESSURE: 100 MMHG

## 2019-12-10 DIAGNOSIS — F43.20 ADJUSTMENT DISORDER, UNSPECIFIED TYPE: Primary | ICD-10-CM

## 2019-12-10 ASSESSMENT — PAIN SCALES - GENERAL: PAINLEVEL: NO PAIN (0)

## 2019-12-10 NOTE — NURSING NOTE
Chief Complaint   Patient presents with     Recheck Medication     pt here for follow up     Kimberly Nissen, EMT at 5:15 PM on 12/10/2019

## 2019-12-10 NOTE — PROGRESS NOTES
[facility]  Primary Care Center   Mara Beard, APRN CNP  12/10/2019      Chief Complaint:   Increased emotional stress     History of Present Illness:   Prasanna DAUGHERTY (Alyssa) Denny is a 59 year old male with a history of Klinefelter's syndrome who presents for evaluation of very recent stress due to work issues.    [employer] met with HR and informed them that she would be undergoing transition male to female surgery 6/10/2020.  Three  weeks after that she was summoned by her manager and informed that her contract would not be continued in 6/2-2020.  She was extremely upset, left her letter of non-renewal on her desk and left.initially she felt suicidal but then decided to not allow this to happen to her. She is planning on filing for FMLA and short term disability.  She will leave for [place] to close on her new house and discuss with her two sons her current situation.    Review of Systems:   Pertinent items are noted in HPI, remainder of complete ROS is negative.      Active Medications:     Current Outpatient Medications:      calcium carbonate (TUMS) 500 MG chewable tablet, Take 1 chew tab by mouth once a week , Disp: , Rfl:      carbamide peroxide (DEBROX) 6.5 % otic solution, Place 5 drops into both ears 2 times daily, Disp: 14.8 mL, Rfl: 3     DULoxetine HCl (CYMBALTA PO), Take 180 mg by mouth daily , Disp: , Rfl:      estradiol (ESTRACE) 2 MG tablet, Take 2 mg by mouth 2 times daily as needed , Disp: , Rfl: 0     LORAZEPAM PO, Take 2 mg by mouth 3 times daily as needed , Disp: , Rfl:      Multiple Vitamin (DAILY MULTIVITAMIN PO), Liquid Organic, Disp: , Rfl:      progesterone (PROMETRIUM) 100 MG capsule, Take 100 mg by mouth 2 times daily as needed, Disp: , Rfl: 1     spironolactone (ALDACTONE) 50 MG tablet, Take 100 mg by mouth 2 times daily as needed , Disp: , Rfl: 0     traZODone (DESYREL) 100 MG tablet, Take 100 mg by mouth nightly as needed , Disp: , Rfl:       Allergies:   Nkda [no known drug  allergies]      Past Medical History:  Right rib fracture  GERD (gastroesophageal reflux disease)  Klinefelter's syndrome  Scapulothoracic syndrome     Past Surgical History:  ACL repair  Cholecystectomy    Family History:   Mother: rheumatoid arthritis  Father: type 2 diabetes mellitus, dementia, abdominal aortic aneurysm, pancreatic cancer, prostate cancer  Paternal uncle: testicular cancer     Social History:   Unmarried  Light tobacco smoker    Physical Exam:   Affect: normal  Speech: normal    Assessment and Plan:  Sress  She has plans, is hiring  a , has plans for future.  She is emotionally stable.     Total time spent 25 minutes.  More than 50% of the time spent with Mr. Baldwin on counseling / coordinating his care.    Mara WARD, CNP        Scribe Preparation Attestation:  I, Asa Randall, served as a scribe preparing the chart for the clinic encounter through chart review for the provider team.      Portions of this medical record were completed by a scribe. UPON MY REVIEW AND AUTHENTICATION BY ELECTRONIC SIGNATURE, this confirms (a) I performed the applicable clinical services, and (b) the record is accurate.

## 2020-02-04 ENCOUNTER — OFFICE VISIT (OUTPATIENT)
Dept: NEUROLOGY | Facility: CLINIC | Age: 60
End: 2020-02-04
Payer: COMMERCIAL

## 2020-02-04 VITALS
HEART RATE: 107 BPM | WEIGHT: 190 LBS | OXYGEN SATURATION: 97 % | DIASTOLIC BLOOD PRESSURE: 94 MMHG | BODY MASS INDEX: 24.38 KG/M2 | SYSTOLIC BLOOD PRESSURE: 146 MMHG | HEIGHT: 74 IN

## 2020-02-04 DIAGNOSIS — G56.80 SCAPULOTHORACIC SYNDROME: Primary | ICD-10-CM

## 2020-02-04 PROCEDURE — 99213 OFFICE O/P EST LOW 20 MIN: CPT | Performed by: PSYCHIATRY & NEUROLOGY

## 2020-02-04 ASSESSMENT — MIFFLIN-ST. JEOR: SCORE: 1741.58

## 2020-02-04 ASSESSMENT — PAIN SCALES - GENERAL: PAINLEVEL: NO PAIN (0)

## 2020-02-04 NOTE — PROGRESS NOTES
2020            Mara Beard NP   Primary Care Center    [address] [facility] [facility] [address]   [place] [address]      Patient:  Prasanna Baldwin   MRN: [id number]   :  1960      Dear Doctors:      I met with Prasanna Baldwin today in followup for periodic evaluation of scapulothoracic dysfunction.  Nito Denny's neuromuscular condition is unchanged.  He is scheduled for sex reassignment surgery on 06/10 and has taken the name Via.        PHYSICAL EXAMINATION:   VITAL SIGNS:  Notable for a blood pressure of 146/94 and pulse 107.  I retook her pulse and it is regular at 104, oxygen saturation is 97%.  She denies confusion, headache, chest discomfort or dyspnea.  She does feel that the hormones have had an effect on her and have made it more difficult for her to exercise.  I expressed some concern, noting that she is listed as taking 180 mg of duloxetine and 100 mg of trazodone; however, she indicates that her current doses are in fact 120 mg of duloxetine and 50 mg of trazodone at bedtime.  Her duloxetine dose has been unchanged for years and her trazodone has been gradually reduced from 200.  She has a mild postural tremor of the right upper extremity which she says has been present since her shoulder surgery.  Skin is not unduly dry.  There is no inducible clonus and reflexes are not increased.      Examination of her scapulae does demonstrate asymmetry of the scapular positioning with slow forward flexion and abduction, as before.      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  I will review her vital signs with Mara Beard.  She does not have a full-blown serotonin syndrome and these doses are, if anything, lower than previous, but I do note that recent vital signs have been notable for a gradual increase in systolic blood pressure as well as pulse.        She will return to the Neurology Clinic in 6 months for routine followup.         Sincerely,      ANA DENNIS MD              D: 2020   T: 2020   MT: ROMANA      Name:     WENDY IRIZARRY   MRN:      [id number]        Account:      [id number]   :      1960      Document: [id number]       cc: Donna Crabtree MD, [facility]       Mara Beard NP

## 2020-02-04 NOTE — LETTER
2020         RE: Prasanna Baldwin   [address] [place] [address]        Dear Colleague,    Thank you for referring your patient, Prasanna Baldwin, to the [facility]. Please see a copy of my visit note below.    2020            Mara Beard NP   Primary [facility] Center    [address] [facility] [place] [facility] [address]   [place] [address]      Patient:  Prasanna Baldwin   MRN: [id number]   :  1960      Dear Doctors:      I met with Prasanna Marquez Denny today in followup for periodic evaluation of scapulothoracic dysfunction.  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  He is scheduled for sex reassignment surgery on 06/10 and has taken the name Via.        PHYSICAL EXAMINATION:   VITAL SIGNS:  Notable for a blood pressure of 146/94 and pulse 107.  I retook her pulse and it is regular at 104, oxygen saturation is 97%.  She denies confusion, headache, chest discomfort or dyspnea.  She does feel that the hormones have had an effect on her and have made it more difficult for her to exercise.  I expressed some concern, noting that she is listed as taking 180 mg of duloxetine and 100 mg of trazodone; however, she indicates that her current doses are in fact 120 mg of duloxetine and 50 mg of trazodone at bedtime.  Her duloxetine dose has been unchanged for years and her trazodone has been gradually reduced from 200.  She has a mild postural tremor of the right upper extremity which she says has been present since her shoulder surgery.  Skin is not unduly dry.  There is no inducible clonus and reflexes are not increased.      Examination of her scapulae does demonstrate asymmetry of the scapular positioning with slow forward flexion and abduction, as before.      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  I will review her vital signs with Mara Beard.  She does not have a full-blown serotonin syndrome and these doses are, if anything, lower than previous, but I do note  that recent vital signs have been notable for a gradual increase in systolic blood pressure as well as pulse.        She will [facility] in 6 months for routine followup.         Sincerely,      ANA DENNIS MD             D: 2020   T: 2020   MT: ROMANA      Name:     WENDY IRIZARRY   MRN:      [id number]        Account:      [id number]   :      1960      Document: [id number]       cc: Donna Crabtree MD, [facility]       Mara Beard NP       Again, thank you for allowing me to participate in the care of your patient.        Sincerely,        Ana Dennis MD

## 2020-02-04 NOTE — NURSING NOTE
"Prasanna Baldwin's goals for this visit include: return  He requests these members of his care team be copied on today's visit information:     PCP: Mara Beard    Referring Provider:  No referring provider defined for this encounter.    BP (!) 146/94   Pulse 107   Ht 1.88 m (6' 2\")   Wt 86.2 kg (190 lb)   SpO2 97%   BMI 24.39 kg/m      Do you need any medication refills at today's visit? n  "

## 2020-03-06 ENCOUNTER — TELEPHONE (OUTPATIENT)
Dept: INTERNAL MEDICINE | Facility: CLINIC | Age: 60
End: 2020-03-06

## 2020-03-06 NOTE — TELEPHONE ENCOUNTER
Pt requested medical records to be faxed to [facility] [employer] for his short term disability.  He is undergoing gender re-assignment.  The Fax number is [phone].  I left a detailed message to the pt that he needs to call HIM for this request. [phone].

## 2020-09-16 PROBLEM — F64.9 GENDER DYSPHORIA: Status: ACTIVE | Noted: 2018-11-16

## 2020-09-20 DIAGNOSIS — F43.20 ADJUSTMENT DISORDER, UNSPECIFIED TYPE: Primary | ICD-10-CM

## 2020-09-20 RX ORDER — TRAZODONE HYDROCHLORIDE 100 MG/1
100 TABLET ORAL
Qty: 30 TABLET | Refills: 0 | Status: SHIPPED | OUTPATIENT
Start: 2020-09-20 | End: 2020-10-20

## 2020-09-21 ENCOUNTER — TELEPHONE (OUTPATIENT)
Dept: NEUROLOGY | Facility: CLINIC | Age: 60
End: 2020-09-21

## 2020-09-21 ENCOUNTER — TELEPHONE (OUTPATIENT)
Dept: INTERNAL MEDICINE | Facility: CLINIC | Age: 60
End: 2020-09-21

## 2020-09-21 NOTE — TELEPHONE ENCOUNTER
I left another voice mail to pt's cell phone that I could schedule the virtual visit with ANGEL Terry on 9/22 at 2:30 pm, but I need her confirmation and asked to call me back.   Also I left  The same voice mail to her work phone number.

## 2020-09-21 NOTE — TELEPHONE ENCOUNTER
Pt had no show on the appt on 9/17/20, wanted to reschedule. I left a detailed message to the pt to call the scheduling line directly or call me back with questions.

## 2020-09-21 NOTE — TELEPHONE ENCOUNTER
I called patient and rescheduled his 9/29 follow-up with Dr. Marie to a telephone appt.    Shikha Cedillo LPN

## 2020-09-21 NOTE — TELEPHONE ENCOUNTER
[facility] Call Center    Phone Message    May a detailed message be left on voicemail: yes , Per Yeison states currently I [place] and states Lane could also talk and can be reached at [phone].    Reason for Call: Other: Per Patients Cousin is wanting to get a call back in regards to Patients behavioral changes, Yeison is wanting to go more in detail with the nurse.  Yeison states that Patient has been denying issues that would need to be seen about. Yeison states wanting to get a call back to know if this is something that Mara Beard can help Patient with. Please advise.     Action Taken: Message routed to:  [facility] ([facility]): pcc    Travel Screening: Not Applicable

## 2020-09-22 ENCOUNTER — TRANSFERRED RECORDS (OUTPATIENT)
Dept: HEALTH INFORMATION MANAGEMENT | Facility: CLINIC | Age: 60
End: 2020-09-22

## 2020-09-22 LAB
ALT SERPL-CCNC: 91 IU/L (ref 8–45)
AST SERPL-CCNC: 43 IU/L (ref 2–40)
TSH SERPL-ACNC: 0.81 UIU/ML (ref 0.35–4.94)

## 2020-09-22 NOTE — TELEPHONE ENCOUNTER
There is no release of information on file for Yeison or whoever Lane is. Unfortunately due to HIPPA regulations we are unable to speak with the patient's family regarding their health information without explicit signed permission from the patient. If they call back please let them know this information.   Melissa Peace, EMT at 11:39 AM on 9/22/2020.

## 2020-09-23 LAB
CREAT SERPL-MCNC: 1.11 MG/DL (ref 0.72–1.25)
GFR SERPL CREATININE-BSD FRML MDRD: >60 ML/MIN/1.73M2
GLUCOSE SERPL-MCNC: 115 MG/DL (ref 65–100)
HBA1C MFR BLD: 5.7 % (ref 0–5.7)
POTASSIUM SERPL-SCNC: 4.5 MMOL/L (ref 3.5–5)

## 2020-09-24 LAB
CHOLEST SERPL-MCNC: 134 MG/DL (ref 100–199)
HDLC SERPL-MCNC: 48 MG/DL
LDLC SERPL CALC-MCNC: 73 MG/DL
NONHDLC SERPL-MCNC: 86 MG/DL
TRIGL SERPL-MCNC: 64 MG/DL

## 2020-09-29 ENCOUNTER — VIRTUAL VISIT (OUTPATIENT)
Dept: NEUROLOGY | Facility: CLINIC | Age: 60
End: 2020-09-29
Payer: COMMERCIAL

## 2020-09-29 DIAGNOSIS — E87.1 HYPONATREMIA: Primary | ICD-10-CM

## 2020-09-29 DIAGNOSIS — G56.80 SCAPULOTHORACIC SYNDROME: Primary | ICD-10-CM

## 2020-09-29 PROCEDURE — 99212 OFFICE O/P EST SF 10 MIN: CPT | Mod: 95 | Performed by: PSYCHIATRY & NEUROLOGY

## 2020-09-29 NOTE — Clinical Note
Mara - see my note. Wanted to be sure you were aware of hospital stay and low sodium in context of possible polydipsia, Cymbalta, and ? Spironolactone. Thanks.     staff - please try to reach Donna Crabtree MD, [facility], so that I may discuss Prasanna's condition with her.

## 2020-09-29 NOTE — LETTER
"    9/29/2020         RE: Prasanna Baldwin  [address]  [place] [address]        Dear Colleague,    Thank you for referring your patient, Prasanna Baldwin, to the [facility]. Please see a copy of my visit note below.    Prasanna Baldwin is a 60 year old male who is being evaluated via a billable telephone visit.      The patient has been notified of following:     \"This telephone visit will be conducted via a call between you and your physician/provider. We have found that certain health care needs can be provided without the need for a physical exam.  This service lets us provide the care you need with a short phone conversation.  If a prescription is necessary we can send it directly to your pharmacy.  If lab work is needed we can place an order for that and you can then stop by our lab to have the test done at a later time.    Telephone visits are billed at different rates depending on your insurance coverage. During this emergency period, for some insurers they may be billed the same as an in-person visit.  Please reach out to your insurance provider with any questions.    If during the course of the call the physician/provider feels a telephone visit is not appropriate, you will not be charged for this service.\"    Patient has given verbal consent for Telephone visit?  Yes    What phone number would you like to be contacted at?[phone]    How would you like to obtain your AVS? Mail a copy     Staff note    Patient seen for scapulothoracic dysfunction, stable. A telephone visit was scheduled. He reports no change in his symptoms with regard to this. Of note, he was hospitalized last week with behavioral symptoms and hyponatremia. It is not clear from the discharge summary that the managing service was aware he was taking Cymbalta. He was started on bupropion. His hyponatremia was attributed to free water intake. I see that his sodium was 132 one year ago.    He indicates he is better. Thought " is linear. He is at his home in [facility]. He has reduced water intake and correctly identified his new and previous medications. He indicates he is using the name Prasanna, is using he/him pronouns, and has chosen not to have gender reassignment surgery. He is off of hormonal therapy. I suspect, but did not ask, that he has stopped spironolactone as well. The sole set of vital signs I saw from the hospital were normal, reducing concern for serotonin syndrome.     Recommendations:    1. It would be prudent to assure that his PCP and psychiatrist are aware of his hospital stay. Cymbalta may be contributing to acute-on-chronic hyponatremia.    2. In-clinic return in 1 year for follow up of scapulothoracic dysfunction.    Dain Marie M.D.    10 minute call.    Addendum: discussed with patient's psychiatrist as well. He and I did discuss that she should be updated and he will be speaking with her tomorrow as well.         Again, thank you for allowing me to participate in the care of your patient.        Sincerely,        Dain Marie MD

## 2020-09-29 NOTE — PROGRESS NOTES
"Prasanna Baldwin is a 60 year old male who is being evaluated via a billable telephone visit.      The patient has been notified of following:     \"This telephone visit will be conducted via a call between you and your physician/provider. We have found that certain health care needs can be provided without the need for a physical exam.  This service lets us provide the care you need with a short phone conversation.  If a prescription is necessary we can send it directly to your pharmacy.  If lab work is needed we can place an order for that and you can then stop by our lab to have the test done at a later time.    Telephone visits are billed at different rates depending on your insurance coverage. During this emergency period, for some insurers they may be billed the same as an in-person visit.  Please reach out to your insurance provider with any questions.    If during the course of the call the physician/provider feels a telephone visit is not appropriate, you will not be charged for this service.\"    Patient has given verbal consent for Telephone visit?  Yes    What phone number would you like to be contacted at?[phone]    How would you like to obtain your AVS? Mail a copy     Staff note    Patient seen for scapulothoracic dysfunction, stable. A telephone visit was scheduled. He reports no change in his symptoms with regard to this. Of note, he was hospitalized last week with behavioral symptoms and hyponatremia. It is not clear from the discharge summary that the managing service was aware he was taking Cymbalta. He was started on bupropion. His hyponatremia was attributed to free water intake. I see that his sodium was 132 one year ago.    He indicates he is better. Thought is linear. He is at his home in [place] [facility] [place]. He has reduced water intake and correctly identified his new and previous medications. He indicates he is using the name Prasanna, is using he/him pronouns, and has chosen not to have gender " reassignment surgery. He is off of hormonal therapy. I suspect, but did not ask, that he has stopped spironolactone as well. The sole set of vital signs I saw from the hospital were normal, reducing concern for serotonin syndrome.     Recommendations:    1. It would be prudent to assure that his PCP and psychiatrist are aware of his hospital stay. Cymbalta may be contributing to acute-on-chronic hyponatremia.    2. In-clinic return in 1 year for follow up of scapulothoracic dysfunction.    Dain Marie M.D.    10 minute call.    Addendum: discussed with patient's psychiatrist as well. He and I did discuss that she should be updated and he will be speaking with her tomorrow as well.

## 2020-09-29 NOTE — PROGRESS NOTES
Telephone call:   I asked pt if her was taking both Cymbalta (Duloxetine) and Buproprion and he said he would have to check.  Stated he was still taking spironolactone but only for another 2 weeks.  Agrees to come in for lab to re-check his sodium this week.     Mara WARD, CNP

## 2020-10-15 ENCOUNTER — OFFICE VISIT (OUTPATIENT)
Dept: INTERNAL MEDICINE | Facility: CLINIC | Age: 60
End: 2020-10-15
Payer: COMMERCIAL

## 2020-10-15 VITALS
HEART RATE: 79 BPM | DIASTOLIC BLOOD PRESSURE: 77 MMHG | WEIGHT: 174 LBS | OXYGEN SATURATION: 99 % | BODY MASS INDEX: 22.34 KG/M2 | SYSTOLIC BLOOD PRESSURE: 129 MMHG

## 2020-10-15 DIAGNOSIS — F39 MOOD DISORDER (H): ICD-10-CM

## 2020-10-15 DIAGNOSIS — E87.1 HYPONATREMIA: Primary | ICD-10-CM

## 2020-10-15 DIAGNOSIS — E87.1 HYPONATREMIA: ICD-10-CM

## 2020-10-15 DIAGNOSIS — E83.51 HYPOCALCEMIA: ICD-10-CM

## 2020-10-15 LAB
ANION GAP SERPL CALCULATED.3IONS-SCNC: 3 MMOL/L (ref 3–14)
BUN SERPL-MCNC: 13 MG/DL (ref 7–30)
CALCIUM SERPL-MCNC: 8.1 MG/DL (ref 8.5–10.1)
CHLORIDE SERPL-SCNC: 104 MMOL/L (ref 94–109)
CO2 SERPL-SCNC: 30 MMOL/L (ref 20–32)
CREAT SERPL-MCNC: 0.86 MG/DL (ref 0.66–1.25)
GFR SERPL CREATININE-BSD FRML MDRD: >90 ML/MIN/{1.73_M2}
GLUCOSE SERPL-MCNC: 106 MG/DL (ref 70–99)
POTASSIUM SERPL-SCNC: 3.9 MMOL/L (ref 3.4–5.3)
SODIUM SERPL-SCNC: 137 MMOL/L (ref 133–144)

## 2020-10-15 PROCEDURE — 80048 BASIC METABOLIC PNL TOTAL CA: CPT | Performed by: PATHOLOGY

## 2020-10-15 PROCEDURE — 99000 SPECIMEN HANDLING OFFICE-LAB: CPT | Performed by: PATHOLOGY

## 2020-10-15 PROCEDURE — 84100 ASSAY OF PHOSPHORUS: CPT | Performed by: PATHOLOGY

## 2020-10-15 PROCEDURE — 82306 VITAMIN D 25 HYDROXY: CPT | Mod: 90 | Performed by: PATHOLOGY

## 2020-10-15 PROCEDURE — 36415 COLL VENOUS BLD VENIPUNCTURE: CPT | Performed by: PATHOLOGY

## 2020-10-15 PROCEDURE — 82040 ASSAY OF SERUM ALBUMIN: CPT | Performed by: PATHOLOGY

## 2020-10-15 PROCEDURE — 99213 OFFICE O/P EST LOW 20 MIN: CPT | Performed by: NURSE PRACTITIONER

## 2020-10-15 ASSESSMENT — PAIN SCALES - GENERAL: PAINLEVEL: NO PAIN (0)

## 2020-10-15 NOTE — NURSING NOTE
Chief Complaint   Patient presents with     Recheck Medication     pt here to follow up, ear wash       Alec Ramsey CMA, EMT at 1:50 PM on 10/15/2020.

## 2020-10-15 NOTE — PATIENT INSTRUCTIONS
[facility] Medication Refill Request Information:  * Please contact your pharmacy regarding ANY request for medication refills.  ** [facility] Prescription Fax = [phone]  * Please allow 3 business days for routine medication refills.  * Please allow 5 business days for controlled substance medication refills.     [facility] Test Result notification information:  *You will be notified with in 7-10 days of your appointment day regarding the results of your test.  If you are on MyChart you will be notified as soon as the provider has reviewed the results and signed off on them.    [facility]: [phone]

## 2020-10-18 NOTE — PROGRESS NOTES
S:     Prasanna Baldwin is a 60 y.o. 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  He was discharged on 9/24/20.  Prasanna had the impression that I wanted to see him today.  This is likely a post-hospital discharge note.   He is weaning down on on his hormones.  He will be finished in 2 weeks.  He states he feels fine.  He is having financial difficulties.  He is hoping his work will pay him out enough that he will be ok financially until he can find a job.  He also has decided he would like to have a . He is not interested in a future marriage.     Patient Active Problem List   Diagnosis     Scapulothoracic syndrome     Gastroesophageal reflux disease without esophagitis     Klinefelter's syndrome diagnosed 12/1980     Gender dysphoria     Male-to-female transgender person            Past Medical History:   Diagnosis Date     Right rib fracture     9th rib, non-displaced.             Past Surgical History:   Procedure Laterality Date     ARTHROSCOPIC RECONSTRUCTION ANTERIOR CRUCIATE LIGAMENT  8/1/2001     CHOLECYSTECTOMY  8/23/2006            Social History     Tobacco Use     Smoking status: Light Tobacco Smoker     Types: Pipe     Smokeless tobacco: Never Used     Tobacco comment: 1 pipe/day   Substance Use Topics     Alcohol use: Yes     Comment: occ beer            Family History   Problem Relation Age of Onset     Rheumatoid Arthritis Mother      Diabetes Type 2  Father      Dementia Father      Abdominal Aortic Aneurysm Father      Pancreatic Cancer Father      Prostate Cancer Father      Cancer Paternal Uncle         testicular               Allergies   Allergen Reactions     Nkda [No Known Drug Allergies]             Current Outpatient Medications   Medication Sig Dispense  Refill     busPIRone HCl (BUSPAR PO)        estradiol (ESTRACE) 2 MG tablet Take 8 mg by mouth daily   0     spironolactone (ALDACTONE) 50 MG tablet Take 50 mg by mouth daily   0     traZODone (DESYREL) 100 MG tablet Take 1 tablet (100 mg) by mouth nightly as needed 30 tablet 0     calcium carbonate (TUMS) 500 MG chewable tablet Take 1 chew tab by mouth once a week        carbamide peroxide (DEBROX) 6.5 % otic solution Place 5 drops into both ears 2 times daily (Patient not taking: Reported on 10/15/2020) 14.8 mL 3     LORAZEPAM PO Take 2 mg by mouth 3 times daily as needed        Multiple Vitamin (DAILY MULTIVITAMIN PO) Liquid Organic       progesterone (PROMETRIUM) 100 MG capsule Take 200 mg by mouth daily   1       ROS:  No new symptoms.    O:   /77 (BP Location: Right arm, Patient Position: Sitting, Cuff Size: Adult Regular)   Pulse 79   Wt 78.9 kg (174 lb)   SpO2 99%   BMI 22.34 kg/m   weight is down 16 pounds from 2/4/20.  GENERAL APPEARANCE: healthy, alert and no distress  HENT: ear canals and TM's normal w/o any cerumen.  RESP: no respiratory distress. - no  wheezes  CV: see VS  NEURO: normal.  MUSK: grossly normal  SKIN: normal  EXT: warm.  Edema NO   PSYCHE: pleasant, normal.    A/P:  Hyponatremia  Will re-check labs today.     Mood disorder  Prasanna feels his mood is normal.  He feels he is thinking clearly and making plans.     .Total time spent 25 minutes.  More than 50% of the time spent with Mr. Baldwin on counseling / coordinating his care.    Mara WARD, CNP

## 2020-10-19 DIAGNOSIS — E83.51 HYPOCALCEMIA: Primary | ICD-10-CM

## 2020-10-19 LAB
ALBUMIN SERPL-MCNC: 3.2 G/DL (ref 3.4–5)
PHOSPHATE SERPL-MCNC: 3.1 MG/DL (ref 2.5–4.5)

## 2020-10-20 DIAGNOSIS — F43.20 ADJUSTMENT DISORDER, UNSPECIFIED TYPE: ICD-10-CM

## 2020-10-20 LAB — DEPRECATED CALCIDIOL+CALCIFEROL SERPL-MC: 33 UG/L (ref 20–75)

## 2020-10-20 RX ORDER — TRAZODONE HYDROCHLORIDE 100 MG/1
100 TABLET ORAL
Qty: 30 TABLET | Refills: 3 | Status: SHIPPED | OUTPATIENT
Start: 2020-10-20 | End: 2021-02-01

## 2020-10-20 NOTE — TELEPHONE ENCOUNTER
traZODone (DESYREL) 100 MG tablet     Last Written Prescription Date:  9/20/20  Last Fill Quantity: 30,   # refills: 0  Last Office Visit : 10/15/20  Future Office visit:  None    Passes refill protocol    Adjustment disorder, unspecified type [F43.20]  - Primary

## 2020-10-30 ENCOUNTER — TRANSFERRED RECORDS (OUTPATIENT)
Dept: HEALTH INFORMATION MANAGEMENT | Facility: CLINIC | Age: 60
End: 2020-10-30

## 2020-10-30 ENCOUNTER — HOSPITAL ENCOUNTER (EMERGENCY)
Facility: CLINIC | Age: 60
Discharge: HOME | End: 2020-10-30
Attending: FAMILY MEDICINE | Admitting: FAMILY MEDICINE
Payer: COMMERCIAL

## 2020-10-30 VITALS
HEART RATE: 81 BPM | TEMPERATURE: 98.3 F | HEIGHT: 73 IN | SYSTOLIC BLOOD PRESSURE: 145 MMHG | OXYGEN SATURATION: 99 % | BODY MASS INDEX: 23.19 KG/M2 | DIASTOLIC BLOOD PRESSURE: 88 MMHG | WEIGHT: 175 LBS | RESPIRATION RATE: 16 BRPM

## 2020-10-30 DIAGNOSIS — G47.9 SLEEP DISTURBANCE: ICD-10-CM

## 2020-10-30 DIAGNOSIS — F32.A DEPRESSION, UNSPECIFIED DEPRESSION TYPE: ICD-10-CM

## 2020-10-30 LAB
ALBUMIN SERPL-MCNC: 3.3 G/DL (ref 3.4–5)
ALP SERPL-CCNC: 52 U/L (ref 40–150)
ALT SERPL W P-5'-P-CCNC: 33 U/L (ref 0–70)
ANION GAP SERPL CALCULATED.3IONS-SCNC: 3 MMOL/L (ref 3–14)
AST SERPL W P-5'-P-CCNC: 13 U/L (ref 0–45)
BASOPHILS # BLD AUTO: 0 10E9/L (ref 0–0.2)
BASOPHILS NFR BLD AUTO: 0.4 %
BILIRUB SERPL-MCNC: 0.3 MG/DL (ref 0.2–1.3)
BUN SERPL-MCNC: 12 MG/DL (ref 7–30)
CALCIUM SERPL-MCNC: 8.4 MG/DL (ref 8.5–10.1)
CHLORIDE SERPL-SCNC: 104 MMOL/L (ref 94–109)
CO2 SERPL-SCNC: 30 MMOL/L (ref 20–32)
CREAT SERPL-MCNC: 0.79 MG/DL (ref 0.66–1.25)
DIFFERENTIAL METHOD BLD: ABNORMAL
EOSINOPHIL # BLD AUTO: 0.1 10E9/L (ref 0–0.7)
EOSINOPHIL NFR BLD AUTO: 1 %
ERYTHROCYTE [DISTWIDTH] IN BLOOD BY AUTOMATED COUNT: 12.8 % (ref 10–15)
GFR SERPL CREATININE-BSD FRML MDRD: >90 ML/MIN/{1.73_M2}
GLUCOSE SERPL-MCNC: 83 MG/DL (ref 70–99)
HCT VFR BLD AUTO: 39.8 % (ref 40–53)
HGB BLD-MCNC: 13 G/DL (ref 13.3–17.7)
IMM GRANULOCYTES # BLD: 0 10E9/L (ref 0–0.4)
IMM GRANULOCYTES NFR BLD: 0.2 %
LYMPHOCYTES # BLD AUTO: 1.1 10E9/L (ref 0.8–5.3)
LYMPHOCYTES NFR BLD AUTO: 20.9 %
MAGNESIUM SERPL-MCNC: 2.1 MG/DL (ref 1.6–2.3)
MCH RBC QN AUTO: 31.5 PG (ref 26.5–33)
MCHC RBC AUTO-ENTMCNC: 32.7 G/DL (ref 31.5–36.5)
MCV RBC AUTO: 96 FL (ref 78–100)
MONOCYTES # BLD AUTO: 0.5 10E9/L (ref 0–1.3)
MONOCYTES NFR BLD AUTO: 9.5 %
NEUTROPHILS # BLD AUTO: 3.4 10E9/L (ref 1.6–8.3)
NEUTROPHILS NFR BLD AUTO: 68 %
NRBC # BLD AUTO: 0 10*3/UL
NRBC BLD AUTO-RTO: 0 /100
PLATELET # BLD AUTO: 215 10E9/L (ref 150–450)
POTASSIUM SERPL-SCNC: 4.5 MMOL/L (ref 3.4–5.3)
PROT SERPL-MCNC: 6.8 G/DL (ref 6.8–8.8)
RBC # BLD AUTO: 4.13 10E12/L (ref 4.4–5.9)
SODIUM SERPL-SCNC: 137 MMOL/L (ref 133–144)
TSH SERPL DL<=0.005 MIU/L-ACNC: 1.1 MU/L (ref 0.4–4)
WBC # BLD AUTO: 5.1 10E9/L (ref 4–11)

## 2020-10-30 PROCEDURE — 83735 ASSAY OF MAGNESIUM: CPT | Performed by: FAMILY MEDICINE

## 2020-10-30 PROCEDURE — 99283 EMERGENCY DEPT VISIT LOW MDM: CPT

## 2020-10-30 PROCEDURE — 85025 COMPLETE CBC W/AUTO DIFF WBC: CPT | Performed by: FAMILY MEDICINE

## 2020-10-30 PROCEDURE — 99283 EMERGENCY DEPT VISIT LOW MDM: CPT | Performed by: FAMILY MEDICINE

## 2020-10-30 PROCEDURE — 84443 ASSAY THYROID STIM HORMONE: CPT | Performed by: FAMILY MEDICINE

## 2020-10-30 PROCEDURE — 36415 COLL VENOUS BLD VENIPUNCTURE: CPT

## 2020-10-30 PROCEDURE — 80053 COMPREHEN METABOLIC PANEL: CPT | Performed by: FAMILY MEDICINE

## 2020-10-30 SDOH — HEALTH STABILITY: MENTAL HEALTH: HOW OFTEN DO YOU HAVE A DRINK CONTAINING ALCOHOL?: NEVER

## 2020-10-30 ASSESSMENT — MIFFLIN-ST. JEOR: SCORE: 1652.67

## 2020-10-30 ASSESSMENT — ENCOUNTER SYMPTOMS
CONFUSION: 0
FEVER: 0
DYSPHORIC MOOD: 1
BRUISES/BLEEDS EASILY: 0
HEADACHES: 0
NERVOUS/ANXIOUS: 1
SLEEP DISTURBANCE: 1
SHORTNESS OF BREATH: 0
AGITATION: 0
HALLUCINATIONS: 0
DECREASED CONCENTRATION: 1
ABDOMINAL PAIN: 0
ACTIVITY CHANGE: 1

## 2020-10-30 NOTE — ED PROVIDER NOTES
ED Provider Note  [facility]      History     Chief Complaint   Patient presents with     Depression     The history is provided by the patient and medical records.     Prasanna Baldwin is a 60 year old male with a past medical history significant for depression, gender dysphoria, Klinefelter's syndrome and GERD who presents to the ED for evaluation of depression.  Patient has had ongoing depressive symptoms for last few months which was on Wellbutrin initially and then switched to BuSpar without any change.  Patient notes sleep difficulty last few nights also waking up middle night unable to sleep some increasing anxiety but no true suicidal or homicidal ideations.  No delusional thought no drug use.  Patient notes his symptoms been ongoing last couple months feel a lot of it is due to isolation.  Would like a change of medications to see if that helps symptoms. Here with friend also.    Past Medical History  Past Medical History:   Diagnosis Date     Right rib fracture     9th rib, non-displaced.      Past Surgical History:   Procedure Laterality Date     ARTHROSCOPIC RECONSTRUCTION ANTERIOR CRUCIATE LIGAMENT  8/1/2001     CHOLECYSTECTOMY  8/23/2006          busPIRone HCl (BUSPAR PO)       traZODone (DESYREL) 100 MG tablet       calcium carbonate (TUMS) 500 MG chewable tablet       carbamide peroxide (DEBROX) 6.5 % otic solution       estradiol (ESTRACE) 2 MG tablet       LORAZEPAM PO       Multiple Vitamin (DAILY MULTIVITAMIN PO)       progesterone (PROMETRIUM) 100 MG capsule       spironolactone (ALDACTONE) 50 MG tablet      Allergies   Allergen Reactions     Nkda [No Known Drug Allergies]      Family History  Family History   Problem Relation Age of Onset     Rheumatoid Arthritis Mother      Diabetes Type 2  Father      Dementia Father      Abdominal Aortic Aneurysm Father      Pancreatic Cancer Father      Prostate Cancer Father      Cancer Paternal Uncle         testicular     Social  "History   Social History     Tobacco Use     Smoking status: Light Tobacco Smoker     Types: Pipe     Smokeless tobacco: Never Used     Tobacco comment: 1 pipe/day   Substance Use Topics     Alcohol use: Not Currently     Frequency: Never     Comment: occ beer     Drug use: No      Past medical history, past surgical history, medications, allergies, family history, and social history were reviewed with the patient. No additional pertinent items.       Review of Systems   Constitutional: Positive for activity change. Negative for fever.   Respiratory: Negative for shortness of breath.    Cardiovascular: Negative for chest pain.   Gastrointestinal: Negative for abdominal pain.   Neurological: Negative for headaches.   Hematological: Does not bruise/bleed easily.   Psychiatric/Behavioral: Positive for decreased concentration, dysphoric mood and sleep disturbance. Negative for agitation, confusion, hallucinations, self-injury and suicidal ideas. The patient is nervous/anxious.    All other systems reviewed and are negative.    A complete review of systems was attempted but limited due to triage evaluation and pending DEC assessment.    Physical Exam   BP: (!) 145/88  Pulse: 81  Temp: 98.3  F (36.8  C)  Resp: 16  Height: 185.4 cm (6' 1\")  Weight: 79.4 kg (175 lb)  SpO2: 99 %  Physical Exam  Vitals signs and nursing note reviewed.   Constitutional:       General: He is in acute distress.      Appearance: He is well-developed. He is not ill-appearing or diaphoretic.      Comments: Patient cooperative here very polite here with friend not suicidal not homicidal.   HENT:      Head: Normocephalic and atraumatic.      Mouth/Throat:      Mouth: Mucous membranes are moist.   Eyes:      General: No scleral icterus.     Pupils: Pupils are equal, round, and reactive to light.   Neck:      Musculoskeletal: Normal range of motion and neck supple.   Cardiovascular:      Rate and Rhythm: Normal rate.   Pulmonary:      Effort: No " "respiratory distress.   Abdominal:      Tenderness: There is no guarding.   Skin:     General: Skin is warm and dry.      Capillary Refill: Capillary refill takes less than 2 seconds.      Findings: No rash.   Neurological:      General: No focal deficit present.      Mental Status: He is alert and oriented to person, place, and time.   Psychiatric:      Comments: Patient affect somewhat flattened here mildly anxious.  Very apologetic is not delusional not suicidal or homicidal.           ED Course           Patient valuated here in the ER as noted discussed plan we did draw blood work from him and evaluated this.  I did place an order for our DEC  to talk to the patient the meantime we are very busy patient was comfortable with his friend waiting in the waiting room.  Labs reviewed as noted below.  It took several hours for the DEC assessment.  By then we did call for the patient several hours later but was not in the waiting room.    I contacted the patient via phone he did answer he stated he was at [facility] at this point he felt he needed to be in the hospital for few days they are looking at a crisis shelter placement where he can have some psychiatric evaluation but not be in a \"locked unit.  Patient given phone number for mental health intake and advised if any problems he could use the [facility] for further mental health support which he does agree with at this point he is waiting to see what disposition was made at [facility].    I apologized to having him wait for several hours but patient understood.  Patient stated that he was in the waiting room and realized that visit was not felt other people needed the ER so that is when he and his friend had left.    Procedures                       Results for orders placed or performed during the hospital encounter of 10/30/20   CBC with platelets differential     Status: Abnormal   Result Value Ref Range    WBC 5.1 " 4.0 - 11.0 10e9/L    RBC Count 4.13 (L) 4.4 - 5.9 10e12/L    Hemoglobin 13.0 (L) 13.3 - 17.7 g/dL    Hematocrit 39.8 (L) 40.0 - 53.0 %    MCV 96 78 - 100 fl    MCH 31.5 26.5 - 33.0 pg    MCHC 32.7 31.5 - 36.5 g/dL    RDW 12.8 10.0 - 15.0 %    Platelet Count 215 150 - 450 10e9/L    Diff Method Automated Method     % Neutrophils 68.0 %    % Lymphocytes 20.9 %    % Monocytes 9.5 %    % Eosinophils 1.0 %    % Basophils 0.4 %    % Immature Granulocytes 0.2 %    Nucleated RBCs 0 0 /100    Absolute Neutrophil 3.4 1.6 - 8.3 10e9/L    Absolute Lymphocytes 1.1 0.8 - 5.3 10e9/L    Absolute Monocytes 0.5 0.0 - 1.3 10e9/L    Absolute Eosinophils 0.1 0.0 - 0.7 10e9/L    Absolute Basophils 0.0 0.0 - 0.2 10e9/L    Abs Immature Granulocytes 0.0 0 - 0.4 10e9/L    Absolute Nucleated RBC 0.0    Comprehensive metabolic panel     Status: Abnormal   Result Value Ref Range    Sodium 137 133 - 144 mmol/L    Potassium 4.5 3.4 - 5.3 mmol/L    Chloride 104 94 - 109 mmol/L    Carbon Dioxide 30 20 - 32 mmol/L    Anion Gap 3 3 - 14 mmol/L    Glucose 83 70 - 99 mg/dL    Urea Nitrogen 12 7 - 30 mg/dL    Creatinine 0.79 0.66 - 1.25 mg/dL    GFR Estimate >90 >60 mL/min/[1.73_m2]    GFR Estimate If Black >90 >60 mL/min/[1.73_m2]    Calcium 8.4 (L) 8.5 - 10.1 mg/dL    Bilirubin Total 0.3 0.2 - 1.3 mg/dL    Albumin 3.3 (L) 3.4 - 5.0 g/dL    Protein Total 6.8 6.8 - 8.8 g/dL    Alkaline Phosphatase 52 40 - 150 U/L    ALT 33 0 - 70 U/L    AST 13 0 - 45 U/L   Magnesium     Status: None   Result Value Ref Range    Magnesium 2.1 1.6 - 2.3 mg/dL   TSH     Status: None   Result Value Ref Range    TSH 1.10 0.40 - 4.00 mU/L          Results for orders placed or performed during the hospital encounter of 10/30/20   CBC with platelets differential     Status: Abnormal   Result Value Ref Range    WBC 5.1 4.0 - 11.0 10e9/L    RBC Count 4.13 (L) 4.4 - 5.9 10e12/L    Hemoglobin 13.0 (L) 13.3 - 17.7 g/dL    Hematocrit 39.8 (L) 40.0 - 53.0 %    MCV 96 78 - 100 fl    MCH  31.5 26.5 - 33.0 pg    MCHC 32.7 31.5 - 36.5 g/dL    RDW 12.8 10.0 - 15.0 %    Platelet Count 215 150 - 450 10e9/L    Diff Method Automated Method     % Neutrophils 68.0 %    % Lymphocytes 20.9 %    % Monocytes 9.5 %    % Eosinophils 1.0 %    % Basophils 0.4 %    % Immature Granulocytes 0.2 %    Nucleated RBCs 0 0 /100    Absolute Neutrophil 3.4 1.6 - 8.3 10e9/L    Absolute Lymphocytes 1.1 0.8 - 5.3 10e9/L    Absolute Monocytes 0.5 0.0 - 1.3 10e9/L    Absolute Eosinophils 0.1 0.0 - 0.7 10e9/L    Absolute Basophils 0.0 0.0 - 0.2 10e9/L    Abs Immature Granulocytes 0.0 0 - 0.4 10e9/L    Absolute Nucleated RBC 0.0    Comprehensive metabolic panel     Status: Abnormal   Result Value Ref Range    Sodium 137 133 - 144 mmol/L    Potassium 4.5 3.4 - 5.3 mmol/L    Chloride 104 94 - 109 mmol/L    Carbon Dioxide 30 20 - 32 mmol/L    Anion Gap 3 3 - 14 mmol/L    Glucose 83 70 - 99 mg/dL    Urea Nitrogen 12 7 - 30 mg/dL    Creatinine 0.79 0.66 - 1.25 mg/dL    GFR Estimate >90 >60 mL/min/[1.73_m2]    GFR Estimate If Black >90 >60 mL/min/[1.73_m2]    Calcium 8.4 (L) 8.5 - 10.1 mg/dL    Bilirubin Total 0.3 0.2 - 1.3 mg/dL    Albumin 3.3 (L) 3.4 - 5.0 g/dL    Protein Total 6.8 6.8 - 8.8 g/dL    Alkaline Phosphatase 52 40 - 150 U/L    ALT 33 0 - 70 U/L    AST 13 0 - 45 U/L   Magnesium     Status: None   Result Value Ref Range    Magnesium 2.1 1.6 - 2.3 mg/dL   TSH     Status: None   Result Value Ref Range    TSH 1.10 0.40 - 4.00 mU/L     Medications - No data to display     Assessments & Plan (with Medical Decision Making)  60-year-old with history of Klinefelter's presented the ER with concerns of sleep issues over the last few nights.  Patient has been diagnosed with depression of the last few months was on Wellbutrin and then switched to BuSpar no change with this.  Patient noted last couple nights that he was unable to sleep and was more anxious and panicky with this.  Patient presented ER wanting help was not suicidal homicidal  was comfortable waiting in our waiting room to talk to our DEC .  Labs are drawn and reviewed and stable otherwise.  It took several hours as they were previous assessments all of all that needed to be done.  After several hours we had checked in the patient he was not in the waiting room anymore.  I contacted him via phone and he was at [facility].  Patient understood how busy was here.  He is working on a crisis shelter versus admission.  I gave him the mental health intake number for [place] at [phone] if he needs any other assistance.  Patient was very cordial and understanding about this and will continue his evaluation at [facility] ER.         I have reviewed the nursing notes. I have reviewed the findings, diagnosis, plan and need for follow up with the patient.    Discharge Medication List as of 10/30/2020  5:36 PM          Final diagnoses:   Depression, unspecified depression type   Sleep disturbance       --      [facility] EMERGENCY DEPARTMENT  10/30/2020    This note was created at least in part by the use of dragon voice dictation system. Inadvertent typographical errors may still exist.  Marcelo Joe MD.    Patient evaluated in the emergency department during the COVID-19 pandemic period. Careful attention to patients safety was addressed throughout the evaluation. Evaluation and treatment management was initiated with disposition made efficiently and appropriate as possible to minimize any risk of potential exposure to patient during this evaluation.       Marcelo Jeo MD  10/30/20 7095

## 2020-10-30 NOTE — ED TRIAGE NOTES
Presents with ongoing depression for the past 2 weeks. Patient reports that his current medication he is taking, Buspar, is not working anymore. Patient states that the last couple of night he has woken up at 3 am and has been unable to fall back asleep. Patient states that he lives alone and that is the biggest factor of why he is depressed. Patient states he is not suicidal.

## 2020-11-01 ENCOUNTER — AMBULATORY - HEALTHEAST (OUTPATIENT)
Dept: BEHAVIORAL HEALTH | Facility: CLINIC | Age: 60
End: 2020-11-01

## 2020-11-01 ENCOUNTER — TELEPHONE (OUTPATIENT)
Dept: BEHAVIORAL HEALTH | Facility: CLINIC | Age: 60
End: 2020-11-01

## 2020-11-01 ENCOUNTER — HOSPITAL ENCOUNTER (EMERGENCY)
Facility: CLINIC | Age: 60
Discharge: PSYCHIATRIC OR SUBSTANCE USE FACILITY | End: 2020-11-01
Attending: EMERGENCY MEDICINE | Admitting: EMERGENCY MEDICINE
Payer: COMMERCIAL

## 2020-11-01 VITALS
RESPIRATION RATE: 20 BRPM | DIASTOLIC BLOOD PRESSURE: 87 MMHG | TEMPERATURE: 98.4 F | HEART RATE: 82 BPM | SYSTOLIC BLOOD PRESSURE: 141 MMHG | OXYGEN SATURATION: 99 %

## 2020-11-01 DIAGNOSIS — F32.A DEPRESSION, UNSPECIFIED DEPRESSION TYPE: ICD-10-CM

## 2020-11-01 LAB
AMPHETAMINES UR QL SCN: NEGATIVE
BARBITURATES UR QL: NEGATIVE
BENZODIAZ UR QL: NEGATIVE
CANNABINOIDS UR QL SCN: NEGATIVE
COCAINE UR QL: NEGATIVE
OPIATES UR QL SCN: NEGATIVE
PCP UR QL SCN: NEGATIVE
SARS-COV-2 RNA SPEC QL NAA+PROBE: NORMAL
SPECIMEN SOURCE: NORMAL

## 2020-11-01 PROCEDURE — 99285 EMERGENCY DEPT VISIT HI MDM: CPT | Mod: 25

## 2020-11-01 PROCEDURE — 80307 DRUG TEST PRSMV CHEM ANLYZR: CPT | Performed by: EMERGENCY MEDICINE

## 2020-11-01 PROCEDURE — 90791 PSYCH DIAGNOSTIC EVALUATION: CPT

## 2020-11-01 PROCEDURE — U0003 INFECTIOUS AGENT DETECTION BY NUCLEIC ACID (DNA OR RNA); SEVERE ACUTE RESPIRATORY SYNDROME CORONAVIRUS 2 (SARS-COV-2) (CORONAVIRUS DISEASE [COVID-19]), AMPLIFIED PROBE TECHNIQUE, MAKING USE OF HIGH THROUGHPUT TECHNOLOGIES AS DESCRIBED BY CMS-2020-01-R: HCPCS | Performed by: EMERGENCY MEDICINE

## 2020-11-01 PROCEDURE — C9803 HOPD COVID-19 SPEC COLLECT: HCPCS

## 2020-11-01 RX ORDER — BUPROPION HYDROCHLORIDE 300 MG/1
300 TABLET ORAL EVERY MORNING
COMMUNITY
End: 2022-11-14

## 2020-11-01 RX ORDER — BUPROPION HYDROCHLORIDE 150 MG/1
150 TABLET ORAL EVERY MORNING
COMMUNITY
End: 2022-11-14

## 2020-11-01 ASSESSMENT — ENCOUNTER SYMPTOMS
APPETITE CHANGE: 0
HALLUCINATIONS: 0
SLEEP DISTURBANCE: 1
DYSPHORIC MOOD: 1

## 2020-11-01 NOTE — TELEPHONE ENCOUNTER
[facility]  B: pt ongoing worsening depression several months. [place] Pt constant thghts of death, Si but with no specific plan. Pt started transitioning from male to female before covid hit.  Procedure in august was cancelled. Pt has now stopped hormone therapy.  Pt states crying all the time, anhedonic, isolating. Life stressors include son moved to college, ex wife moved to [place].   A: dx depression.  Calm, cooperative, vol. Asymptomatic, covid test pending.   R:   Patient cleared and ready for behavioral bed placement: Yes

## 2020-11-01 NOTE — ED PROVIDER NOTES
"  History   Chief Complaint:  Depression    The history is provided by medical records, the patient and a friend.      Prasanna Baldwin is a 60 year old male with history of Klinefelter's syndrome who presents with depression. Per chart review, the patient was seen at the [facility] and [facility] on 10/30 for concerns for worsening depression and endorsed desire for inpatient admission. Today, he presents to the ED for ongoing and worsening depression. He states \"I'm depressed and lonely.\" When asked about suicidal ideation he states he would like to \"poof and go away\" though he denies a specific plan in the past or currently. Beyond feelings of depression, he endorses worsening difficulty sleeping with only 5 hours of sleep last night. He has no appetite change or hallucination. He does have a virtual therapist and is taking his Buspar and Trazodone as prescribed. He denies other medication. When questioned what he has to live for he states that he enjoys walking and being around people, but due to COVID he feels isolated and alone. 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     The friend who is in the room with the patient states the patient is repeating phrases more frequently than he used to and feels the patient is more revved up. The patient affirms he feels he is making impulsive decisions that he later regrets the next day. The friend states that the patient told him he awoke screaming on 10/28 and then ran out into the street. He then went back into the house and cried all night. The friend states the patient often asks him to call at a specific time each morning to make sure he is ok.      Allergies:  No Known Drug Allergies    Medications:   Buspar  Trazodone     Past Medical History:    Male to female transgender   Gastroesophageal reflux disease  Klinefelter's syndrome  Scapulothoracic syndrome     Past Surgical " History:    Cholecystectomy  Arthroscopic reconstruction anterior cruciate ligament      Family History:    Rheumatoid arthritis  Type 2 diabetes mellitus  Dementia  AAA  Prostate cancer  Pancreatic cancer     Social History:  The patient was accompanied to the ED by friend.  Smoking Status: light tobacco smoker  Smokeless Tobacco: Never Used  Alcohol Use: Not currently  Drug Use: No  PCP: Mara Beard     Review of Systems   Constitutional: Negative for appetite change.   Psychiatric/Behavioral: Positive for dysphoric mood, sleep disturbance and suicidal ideas. Negative for hallucinations and self-injury.   All other systems reviewed and are negative.    Physical Exam     Patient Vitals for the past 24 hrs:   BP Pulse Resp SpO2   11/01/20 1551 137/86 82 18 99 %       Physical Exam  General: Patient in mild distress.  Alert and cooperative with exam. Normal mentation  HEENT: NC/AT. Conjunctiva without injection or scleral icterus. External ears normal.  Respiratory: Breathing comfortably on room air  CV: Normal rate, all extremities well perfused  GI:  Non-distended abdomen  Skin: Warm, dry, no rashes/open wounds on exposed skin  Musculoskeletal: No obvious deformities  Neuro: Alert, answers questions appropriately. No gross motor deficits  Psychiatric: Endorses depression. Dave SI, HI, and hallucinations.     Emergency Department Course   Laboratory:    COVID19 Virus PCR by nasopharyngeal swab pending    Drug abuse screen 77 urine: pending      Emergency Department Course:  Nursing notes and vitals reviewed.    1140 I performed an exam of the patient as documented above.     1319 I spoke with JIN from DEC regarding his assessment of the patient.     1415 I rechecked the patient. He is comfortable with plan for admission.     1500 I transferred care to my colleague Dr. Nuno.     Impression & Plan    Covid-19  Prasanna Baldwin was evaluated during a global COVID-19 pandemic, which necessitated consideration  that the patient might be at risk for infection with the SARS-CoV-2 virus that causes COVID-19.   Applicable protocols for evaluation were followed during the patient's care.   COVID-19 was considered as part of the patient's evaluation. The plan for testing is:  a test was obtained during this visit    Medical Decision Making:  Prasanna Baldwin is a 60 year old male who presents to the emergency department today for evaluation of depression.  Patient's medical history and records were reviewed.  Patient denies suicidal ideation or homicidal ideation.  Denies toxic ingestion, drug use, or intoxication.  Evaluated by DEC and felt appropriate for voluntary mental health admission; I agree with this assessment.  Patient is not on a hold.  He remained calm and cooperative throughout my care.  Asymptomatic Covid testing and urine drug screen pending.  Signed out to my partner Dr. Nuno pending available mental health bed    Diagnosis:    ICD-10-CM    1. Depression, unspecified depression type  F32.9      Disposition:   Awaiting availability of mental health bed.    Scribe Disclosure:  I, Cee Pierson, am serving as a scribe at 11:06 AM on 11/1/2020 to document services personally performed by Victor Hugo Rios DO based on my observations and the provider's statements to me.   [facility] EMERGENCY DEPARTMENT       Victor Hugo Rios DO  11/01/20 0679

## 2020-11-01 NOTE — ED PROVIDER NOTES
Patient is a 61 yo male who is transitioning to female who has been feeling increasingly depressed recently. He is not suicidal at this time. He is currently voluntary for admission to inpatient psychiatric bed.     Admitted to [facility] for inpatient psych       Victor Hugo Nuno MD  11/01/20 2126

## 2020-11-01 NOTE — CONSULTS
"Situation:    Pt is a 60 year old male with a hx of depressed mood who presents to the ED with reports increasing depression and thoughts of death.    Background:    Pt states he has been feeling increasingly depressed over the past 3 months starting in August. Pt states he was in process of transitioning from male to female.  Pt reports he had a date set at a hospital in [place], but then Covid came. Pt states he had the opportunity to go through with the procedure, but he was afraid of the risk during a pandemic.  As result the Pt is currently weening off of Hormone medications.  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  Pt identifies the following symptoms associated with his depressed mood: agitation, crying, difficulty sleeping, forgetfulness, disorganized behavior, lack of motivation and withdrawal from people.      Pt reports ongoing increasing thoughts of death.  Pt states he wishes \"he was poof gone\" during all of this.  Pt denies any plans or intent of suicide.  Pt denies any hx of suicidal ideation, plans or intent.  Pt reports on other mental health hospitalization earlier this month at [facility] and ED visit two days ago in the ED at [facility] with admission recommended, but there were no beds.    Pt describes a significant deviation from baseline functioning.  Pt states he is staying in bed, not sleeping well, not eating well and having difficulty completing self cares. Pt is requesting in-Pt admission for medication and mood stabilization.  "

## 2020-11-01 NOTE — PHARMACY-ADMISSION MEDICATION HISTORY
Pharmacy Medication History  Admission medication history interview status for the 11/1/2020  admission is complete. See EPIC admission navigator for prior to admission medications       Medication history sources: Patient and Surescripts  Location of interview: phone  Medication history source reliability: Good  Adherence assessment: Good    Significant changes made to the medication list:  Added bupropion, emergen-C.  Deleted buspirone, tums, debrox, MVI, spironolactone  Changed estradiol dose, trazodone dose      Additional medication history information:   Pt states he is finishing up estradiol and progesterone supply. Doesn't have supply of lorazepam at home but got one at [facility] yesterday.    Medication reconciliation completed by provider prior to medication history? No    Time spent in this activity: 15 minutes      Prior to Admission medications    Medication Sig Last Dose Taking? Auth Provider   buPROPion (WELLBUTRIN XL) 150 MG 24 hr tablet Take 150 mg by mouth every morning With 300mg for total of 450mg 11/1/2020 at am Yes Unknown, Entered By History   buPROPion (WELLBUTRIN XL) 300 MG 24 hr tablet Take 300 mg by mouth every morning With 150mg for total of 450mg 11/1/2020 at am Yes Unknown, Entered By History   estradiol (ESTRACE) 2 MG tablet Take 4 mg by mouth daily  11/1/2020 at am Yes Reported, Patient   LORAZEPAM PO Take 2 mg by mouth once  10/31/2020 at Unknown time Yes Reported, Patient   Multiple Vitamins-Minerals (EMERGEN-C IMMUNE PO) Take 1 tablet by mouth daily 10/31/2020 at am Yes Unknown, Entered By History   progesterone (PROMETRIUM) 100 MG capsule Take 200 mg by mouth daily  10/31/2020 at pm Yes Reported, Patient   traZODone (DESYREL) 100 MG tablet Take 1 tablet (100 mg) by mouth nightly as needed for sleep  Patient taking differently: Take 100-200 mg by mouth nightly as needed for sleep  10/31/2020 at Unknown time Yes Mara Beard APRN CNP

## 2020-11-01 NOTE — ED TRIAGE NOTES
"Pt presents for eval of depression. Pt was at [facility] last night, but bed not available this time. Pt wants to \"poof\" and be gone. No suicide plan but thoughts. Denies Homicidal.    "

## 2020-11-02 LAB
LABORATORY COMMENT REPORT: NORMAL
SARS-COV-2 RNA SPEC QL NAA+PROBE: NEGATIVE
SPECIMEN SOURCE: NORMAL

## 2020-11-03 NOTE — TELEPHONE ENCOUNTER
[facility]BE from [facility]500 called, patient to discharge 11.4, interested in day tx, iop or php post discharge, referral created, info sent to check benefits and appot scheduled 11.11.2020 @ 1030 via [employer] with Gogo Allan for adult  noraal

## 2020-11-04 ENCOUNTER — COMMUNICATION - HEALTHEAST (OUTPATIENT)
Dept: SCHEDULING | Facility: CLINIC | Age: 60
End: 2020-11-04

## 2020-11-06 ENCOUNTER — HOSPITAL ENCOUNTER (OUTPATIENT)
Dept: BEHAVIORAL HEALTH | Facility: CLINIC | Age: 60
Discharge: HOME | End: 2020-11-06
Attending: FAMILY MEDICINE | Admitting: FAMILY MEDICINE
Payer: COMMERCIAL

## 2020-11-06 PROCEDURE — 90791 PSYCH DIAGNOSTIC EVALUATION: CPT | Mod: 95 | Performed by: SOCIAL WORKER

## 2020-11-06 PROCEDURE — 90791 PSYCH DIAGNOSTIC EVALUATION: CPT | Mod: TEL | Performed by: SOCIAL WORKER

## 2020-11-06 ASSESSMENT — ANXIETY QUESTIONNAIRES
GAD7 TOTAL SCORE: 6
3. WORRYING TOO MUCH ABOUT DIFFERENT THINGS: MORE THAN HALF THE DAYS
1. FEELING NERVOUS, ANXIOUS, OR ON EDGE: SEVERAL DAYS
IF YOU CHECKED OFF ANY PROBLEMS ON THIS QUESTIONNAIRE, HOW DIFFICULT HAVE THESE PROBLEMS MADE IT FOR YOU TO DO YOUR WORK, TAKE CARE OF THINGS AT HOME, OR GET ALONG WITH OTHER PEOPLE: SOMEWHAT DIFFICULT
5. BEING SO RESTLESS THAT IT IS HARD TO SIT STILL: NOT AT ALL
2. NOT BEING ABLE TO STOP OR CONTROL WORRYING: MORE THAN HALF THE DAYS
4. TROUBLE RELAXING: SEVERAL DAYS
6. BECOMING EASILY ANNOYED OR IRRITABLE: NOT AT ALL
7. FEELING AFRAID AS IF SOMETHING AWFUL MIGHT HAPPEN: NOT AT ALL

## 2020-11-06 ASSESSMENT — PATIENT HEALTH QUESTIONNAIRE - PHQ9: SUM OF ALL RESPONSES TO PHQ QUESTIONS 1-9: 3

## 2020-11-06 NOTE — PROGRESS NOTES
"Mental Health Assessment Center  Evaluator Name:    Nadege Caraballo [facility]  Mental Health and Addiction Evaluation Center  Phone: [phone]      PATIENT'S NAME: Prasanna Baldwin  PREFERRED NAME: Prasanna  PRONOUNS: he/him/his     MRN:   [id number]  :   1960   ACCT. NUMBER: [id number]  DATE OF SERVICE: 20  START TIME: 8:00am  END TIME: 9:00am  PREFERRED PHONE: [phone]  May we leave a program related message: Yes  SERVICE MODALITY:  Phone Visit:    The patient has been notified of the following:      \"We have found that certain health care needs can be provided without the need for a face to face visit.  This service lets us provide the care you need with a phone conversation.       I will have full access to your [facility] medical record during this entire phone call.   I will be taking notes for your medical record.      Since this is like an office visit, we will bill your insurance company for this service.       There are potential benefits and risks of telephone visits (e.g. limits to patient confidentiality) that differ from in-person visits.?  Confidentiality still applies for telephone services, and nobody will record the visit.  It is important to be in a quiet, private space that is free of distractions (including cell phone or other devices) during the visit.??      If during the course of the call I believe a telephone visit is not appropriate, you will not be charged for this service\"     Consent has been obtained for this service by care team member: Yes     UNIVERSAL ADULT DIAGNOSTIC ASSESSMENT      Identifying Information:  Patient is a 60 year old, .  The pronoun use throughout this assessment reflects the patient's chosen pronoun.  Patient was referred for an assessment by [facility].  Patient attended the session alone.     Chief Complaint:   The reason for seeking services at this time is: \"I was going to through transition and then covid hit and " "I stopped it. I'm  for about a year three months ago. It was kind of an ugly divorce but it was needed and my ex-wife moved to [facility] a week ago and left me with the two kids.   I was depressed because I'm leaving alone right now.\"    The problem(s) began about 3 months ago and worsening symptoms about two weeks ago. Patient has attempted to resolve these concerns in the past through therapy.     Patient was seen in the ED on 11/1/2020.  Per provider notes, \"Per ED notes on 11/1/20- \"Pt states he has been feeling increasingly depressed over the past 3 months starting in August. Pt states he was in process of transitioning from male to female.  Pt reports he had a date set at a hospital in [place], but then Covid came. Pt states he had the opportunity to go through with the procedure, but he was afraid of the risk during a pandemic.  As result the Pt is currently weening off of Hormone medications.  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  Pt identifies the following symptoms associated with his depressed mood: agitation, crying, difficulty sleeping, forgetfulness, disorganized behavior, lack of motivation and withdrawal from people.   Pt reports ongoing increasing thoughts of death.  Pt states he wishes \"he was poof gone\" during all of this.  Pt denies any plans or intent of suicide.  Pt denies any hx of suicidal ideation, plans or intent.  Pt reports on other mental health hospitalization earlier this month at [facility] and ED visit two days ago in the ED at [facility] with admission recommended, but there were no beds.Pt describes a significant deviation from baseline functioning.  Pt states he is staying in bed, not sleeping well, not eating well and having difficulty completing self cares. Pt is requesting in-Pt admission for medication " "and mood stabilization\"- Javier Mojica DEC        The patient was admitted to [facility] and discharged on 11/4/2020.  Per the discharge summary \"This is a 60 y.o. adult with history of depression, anxiety and insomnia. Does not have prior history of psychiatric hospitalization or chemical dependency treatment. Now, presenting upon transfer from outside emergency department due to worsening of depressive symptoms for approximately 3 months time due to multifactorial stressors.  Coordination of cares performed in detail. Includes, detailed review of care everywhere. Please see chart documentation for details.   In brief, patient presents for repeat emergency department visit to [facility] at previous visit to outside emergency department with similar symptoms of presentation, but discharged due to lack of mental health that availability. Presenting with friend due to continued signs and symptoms of depression for 3 months time. Occurring in the context  of multifactorial stressors to include the following: Male to female gender reassignment surgery delayed secondary to pandemic; son going to college; ex-wife left to [place] within the past week. Leading to impairment of self-cares. Upon patient interview, patient review performed on unit 5 AB directly. Patient is cooperative on approach. Reviewed events into presentation and clarified information as provided in collateral report.  Patient reports no prior mental health history until recent start of medication management. Starting Wellbutrin XL and trazodone within the past few months to outpatient provider, as documented. Recent titration within the past week to current dosage. Tolerating medication without side effect and with efficacy. However, efforts of obtaining medication due to emergency department visits. Now, on correct medication as documented. Leading to stabilization of presenting symptoms. Presenting symptoms include: " "Agitation, sialorrhea, disorganized behavior with forgetfulness, lack of motivation.  On further psychiatric review of symptoms, now describes mood as, \"better.\" Denies ever and/or hypomania. Improved sleep with trazodone. Stabilized energy. Intact appetite and weight. Intact concentration. Denies negative thoughts of self or situation. Future oriented. Denies tearfulness. Coping with stressors as documented. Denies psychosis. Denies suicidal and homicidal ideation. Future oriented. Stating an appropriate crisis and maps plan. Denies gun access. Denies drug and alcohol use.- Luís Gonzalez MD            Social/Family History:  Patient reported they grew up in [facility].  They were raised by biological parents.  Parents stayed .. Both parents are now .  Patient reported that their childhood was \"I was top 10 in my class, I was an eagle ,\".  Patient described their current relationships with family of origin as \"my relationshp with my sister is great\".    The patient describes their cultural background as American.  Cultural influences and impact on patient's life structure, values, norms, and healthcare: I was raised [employer].  Contextual influences on patient's health include: Individual Factors -life changes including recent divorce and now leaving alone.    These factors will be addressed in the Preliminary Treatment plan.  Patient identified their preferred language to be English. Patient reported they does not need the assistance of an  or other support involved in therapy.     Patient reported had no significant delays in developmental tasks.   Patient's highest education level was graduate school. Patient identified the following learning problems: none reported.  Modifications will not be used to assist communication in therapy.   Patient reports they are  able to understand written materials.    Patient reported the following relationship history.  Patient's current " "relationship status is  for one year and three months.   Patient identified their sexual orientation as heterosexual.  Patient reported having two child(taty). Patient identified I have whole bunch of people  as part of their support system.  Patient identified the quality of these relationships as stable and meaningful.      Patient's current living/housing situation involves staying in own home/apartment.  They live alone and they report that housing is stable.     Patient is currently employed full time and reports they are able to function appropriately at work.. Per patient \"I started taking hormones a year and 5 months ago and November of 2019 after I was  I came out at work and said I was transitioning. The HR directior said \"great\" but two weeks later I was non-rewened.\"  Patient reports his December 2nd is last of work- as  at [employer].    Patient reports their finances are obtained through employment.  Patient does not identify finances as a current stressor.      Patient reported that they have not been involved with the legal system.   Patient denies being on probation / parole / under the jurisdiction of the court.    Patient's Strengths and Limitations:  Patient identified the following strengths or resources that will help them succeed in treatment: commitment to health and well being, friends / good social support, insight, intelligence and motivation. Things that may interfere with the patient's success in treatment include: none identified.     Personal and Family Medical History:   Patient does not report a family history of mental health concerns.  Patient reports family history includes Abdominal Aortic Aneurysm in his father; Cancer in his paternal uncle; Dementia in his father; Diabetes Type 2  in his father; Pancreatic Cancer in his father; Prostate Cancer in his father; Rheumatoid Arthritis in his mother..     Patient does report Mental Health Diagnosis " "and/or Treatment.  Patient Patient reported the following previous diagnoses which include(s): Depression.  Patient reported symptoms began \"in the last week\".  The patient reports \"episodes\" in the 80s which were situational but no formal diagnosis.   Patient has not received mental health services in the past: none.  Psychiatric Hospitalizations: [facility] first admission.  Patient denies a history of civil commitment.  Currently, patient is receiving other mental health services.  These include psychotherapy with [facility] and psychiatry with [facility].  Next appointment: unknown.           Patient has had a physical exam to rule out medical causes for current symptoms.  Date of last physical exam was within the past year. Client was encouraged to follow up with PCP if symptoms were to develop. The patient has a [facility] Primary Care Provider, who is named Mara Beard.  Patient reports no current medical concerns.  There are not significant appetite / nutritional concerns / weight changes.   Patient does not report a history of head injury / trauma / cognitive impairment.      Patient reports current meds as:   Outpatient Medications Marked as Taking for the 11/6/20 encounter (Hospital Encounter) with Jose Caraballo LICSW   Medication Sig     buPROPion (WELLBUTRIN XL) 150 MG 24 hr tablet Take 150 mg by mouth every morning With 300mg for total of 450mg     buPROPion (WELLBUTRIN XL) 300 MG 24 hr tablet Take 300 mg by mouth every morning With 150mg for total of 450mg     estradiol (ESTRACE) 2 MG tablet Take 4 mg by mouth daily      LORAZEPAM PO Take 2 mg by mouth once      Multiple Vitamins-Minerals (EMERGEN-C IMMUNE PO) Take 1 tablet by mouth daily     progesterone (PROMETRIUM) 100 MG capsule Take 200 mg by mouth daily      traZODone (DESYREL) 100 MG tablet Take 1 tablet (100 mg) by mouth nightly as needed for sleep (Patient taking differently: Take " 100-200 mg by mouth nightly as needed for sleep )       Medication Adherence:  Patient reports taking prescribed medications as prescribed.    Patient Allergies:    Allergies   Allergen Reactions     Nkda [No Known Drug Allergies]        Medical History:    Past Medical History:   Diagnosis Date     Depression      Right rib fracture     9th rib, non-displaced.           Current Mental Status Exam:   Unable to complete full MSE. DA completed via a telephone visit  Attitude / Demeanor: Cooperative  Interested  Speech      Rate / Production: Normal/ Responsive      Volume:  Normal  volume      Language:  intact, no problems and good  Mood:   Normal  Thought Content: Clear   Thought Process: Coherent  Goal Directed  Logical       Associations: No loosening of associations  Insight:   Good   Judgment:  Intact   Orientation:  All  Attention/concentration: Good      Rating Scales:    PHQ9:    PHQ-9 SCORE 11/6/2020   PHQ-9 Total Score 3   ;    GAD7:    RHODA-7 SCORE 11/6/2020   Total Score 6     CGI:     First:No data recorded;    Most recentNo data recorded    Substance Use:  Patient did not report a family history of substance use concerns; see medical history section for details.  Patient has not received chemical dependency treatment in the past.  Patient has not ever been to detox.      Patient is not currently receiving any chemical dependency treatment. Patient reported the following problems as a result of their substance use: N/A.    Patient denies using alcohol.   Patient reports using tobacco. Smokes a pipe for 2-3 mins.   Patient denies using marijuana.  Patient denies using caffeine. I stopped drinking coffee  Patient reports using/abusing the following substance(s). Patient reported no other substance use.     CAGE- AID:    CAGE-AID Total Score 11/6/2020   Total Score 0       Substance Use: N/A    Based on the negative CAGE score and clinical interview there  are not indications of drug or alcohol  abuse.      Significant Losses / Trauma / Abuse / Neglect Issues:   Patient did not serve in the .  There are indications or report of significant loss, trauma, abuse or neglect issues related to: death of mother passed away in 97 and dad in 2002  Concerns for possible neglect are not present.     Safety Assessment:   Current Safety Concerns:  [place] Suicide Severity Rating Scale (Short Version)  [place] Suicide Severity Rating (Short Version) 10/30/2020 11/1/2020 11/6/2020   Over the past 2 weeks have you felt down, depressed, or hopeless? yes no no   Over the past 2 weeks have you had thoughts of killing yourself? no no no   Have you ever attempted to kill yourself? no no no     Patient denies current homicidal ideation and behaviors.  Patient denies current self-injurious ideation and behaviors.    Patient denied risk behaviors associated with substance use.  Patient denies any high risk behaviors associated with mental health symptoms.  Patient reports the following current concerns for their personal safety: None.  Patient reports there are not  firearms in the house. .     History of Safety Concerns:  Patient denied a history of homicidal ideation.     Patient denied a history of personal safety concerns.    Patient denied a history of assaultive behaviors.    Patient denied a history of sexual assault behaviors.     Patient denied a history of risk behaviors associated with substance use.  Patient denies any history of high risk behaviors associated with mental health symptoms.  Patient reports the following protective factors: positive relationships positive social network and positive family connections, forward/future oriented thinking, dedication to family/friends, safe and stable environment, committment to well-being, sense of meaning and positive social skills    Risk Plan:  See Recommendations for Safety and Risk Management Plan    Review of Symptoms per patient report:  Patient reports  improved symptoms since admission/discharge from inpatient level of care. Patient denied symptoms reporting that prior to admission he was struggling but now doing better  Depression: No symptoms   Shreya:  No Symptoms  Psychosis: No Symptoms  Anxiety: No Symptoms  Panic:  No symptoms  Post Traumatic Stress Disorder:  No Symptoms   Eating Disorder: No Symptoms  ADD / ADHD:  No symptoms  Conduct Disorder: No symptoms  Autism Spectrum Disorder: No symptoms  Obsessive Compulsive Disorder: No Symptoms    Patient reports the following compulsive behaviors and treatment history: none.      Diagnostic Criteria:   A) Single episode - symptoms have been present during the same 2-week period and represent a change from previous functioning 5 or more symptoms (required for diagnosis)   - Depressed mood. Note: In children and adolescents, can be irritable mood.     - Diminished interest or pleasure in all, or almost all, activities.    - Fatigue or loss of energy.    - Diminished ability to think or concentrate, or indecisiveness.   B) The symptoms cause clinically significant distress or impairment in social, occupational, or other important areas of functioning  C) The episode is not attributable to the physiological effects of a substance or to another medical condition  D) The occurence of major depressive episode is not better explained by other thought / psychotic disorders  E) There has never been a manic episode or hypomanic episode    Functional Status:  Patient reports the following functional impairments: self care.     WHODAS:   WHODAS 2.0 Total Score 11/6/2020   Total Score 16       Clinical Summary:  1. Reason for assessment: To determine appropriate level of care  .  2. Psychosocial, Cultural and Contextual Factors: Feeling alone, pending job loss  .  3. Principal DSM5 Diagnoses  (Sustained by DSM5 Criteria Listed Above):   296.20 (F32.9) Major Depressive Disorder, Single Episode, Unspecified _.  4. Other  Diagnoses that is relevant to services:   .  5. Provisional Diagnosis:     6. Prognosis: Maintain Current Status / Prevent Deterioration.  7. Likely consequences of symptoms if not treated: If untreated, patient's mental health will likely deteriorate and may require a higher level of care.   8. Client strengths include:  educated, goal-focused, good listener, insightful, intelligent and motivated .     Recommendations:     1. Plan for Safety and Risk Management:   A safety and risk management plan has been developed including: Patient consented to co-developed safety plan.  Safety and risk management plan was completed.  Patient agreed to use safety plan should any safety concerns arise.  A copy was given to the patient..          Report to child / adult protection services was NA.     2. Patient did not identify Restorationist, ethnic or cultural issues relevant to therapy at this time      3. Initial Treatment will focus on:    Depressed Mood -   Anxiety - .     4. Resources/Service Plan:    services are not indicated.   Modifications to assist communication are not indicated.   Additional disability accommodations are not indicated.      5. Collaboration:   Collaboration / coordination of treatment will be initiated with the following  support professionals: none.      6.  Referrals:   The following referral(s) will be initiated: 55+ [facility]. Next Scheduled Appointment: 11/10/20.     A Release of Information has been obtained for the following: emergency contact.    7. MARY KAY:    MARY KAY:  Discussed the general effects of drugs and alcohol on health and well-being.     8. Records:    were reviewed at time of assessment.   Information in this assessment was obtained from the medical record and  provided by patient who is a good historian.    Patient will have open access to their mental health medical record.      Eval type:  Mental Health    Provider Name/ Credentials:  Nadege Caraballo  "[facility]   November 6, 2020                        Outpatient Mental Health Services - Adult    MY COPING PLAN FOR SAFETY    PATIENT'S NAME: Prasanna Baldwin  MRN:   [id number]    SAFETY PLAN:    Step 1: Warning signs / cues (Thoughts, images, mood, situation, behavior) that a crisis may be developing:      Thoughts: \"I don't matter\", \"People would be better off without me\", \"I'm a burden\", \"I can't do this anymore\", \"I just want this to end\" and \"Nothing makes it better\"    Images: none    Thinking Processes: ruminations (can't stop thinking about my problems)    Mood: worsening depression and intense worry    Behaviors: not taking care of my responsibilities    Situations: feeling lonely       Step 2: Coping strategies - Things I can do to take my mind off of my problems without contacting another person (relaxation technique, physical activity):      Distress Tolerance Strategies:   \" Relaxation Techniques I learned at [facility].\" such as Chair Yoga     Physical Activities: Walks    Focus on helpful thoughts:  \"I will get through this\"    Step 3: People and social settings that provide distraction:     Name: Cheo(Friend) Phone:    Name:  Phone:         Step 4: Remind myself of people and things that are important to me and worth living for:  Friends and family    Step 5: When I am in crisis, I can ask these people to help me use my safety plan:     Name: Bree (Friend) Phone:    Name: Stepan (Friend) Phone:     Step 6: Making the environment safe:       be around others    Step 7: Professionals or agencies I can contact during a crisis:      Suicide Prevention Lifeline: 6-592-900-PBXK (1805)    Crisis Text Line Service (available 24 hours a day, 7 days a week): Text [place] to 423372    Call  **CRISIS (970899) from a cell phone to talk to a team of professionals who can help you.    Crisis Services By [facility]: Phone Number:   [place]     [phone]   [place]    [phone]   [place]    [phone]   [place]    [phone] "   [place]    [phone]   [place] [phone]   [place]     [phone]       Call 911 or go to my nearest emergency department.     I helped develop this safety plan and agree to use it when needed.  I have been given a copy of this plan.        Today s date:  11/6/2020  Adapted from Safety Plan Template 2008 Mahi Redmond and Charlie Villafana is reprinted with the express permission of the authors.  No portion of the Safety Plan Template may be reproduced without the express, written permission.  You can contact the authors at [email or web address] or [email or web address]

## 2020-11-07 ENCOUNTER — HEALTH MAINTENANCE LETTER (OUTPATIENT)
Age: 60
End: 2020-11-07

## 2020-11-07 ASSESSMENT — ANXIETY QUESTIONNAIRES: GAD7 TOTAL SCORE: 6

## 2020-11-10 ENCOUNTER — TELEPHONE (OUTPATIENT)
Dept: BEHAVIORAL HEALTH | Facility: CLINIC | Age: 60
End: 2020-11-10

## 2020-11-10 ENCOUNTER — VIRTUAL VISIT (OUTPATIENT)
Dept: INTERNAL MEDICINE | Facility: CLINIC | Age: 60
End: 2020-11-10
Payer: COMMERCIAL

## 2020-11-10 DIAGNOSIS — F43.20 ADJUSTMENT DISORDER, UNSPECIFIED TYPE: Primary | ICD-10-CM

## 2020-11-10 PROCEDURE — 99212 OFFICE O/P EST SF 10 MIN: CPT | Mod: TEL | Performed by: NURSE PRACTITIONER

## 2020-11-10 ASSESSMENT — ANXIETY QUESTIONNAIRES
7. FEELING AFRAID AS IF SOMETHING AWFUL MIGHT HAPPEN: SEVERAL DAYS
GAD7 TOTAL SCORE: 2
3. WORRYING TOO MUCH ABOUT DIFFERENT THINGS: NOT AT ALL
5. BEING SO RESTLESS THAT IT IS HARD TO SIT STILL: NOT AT ALL
IF YOU CHECKED OFF ANY PROBLEMS ON THIS QUESTIONNAIRE, HOW DIFFICULT HAVE THESE PROBLEMS MADE IT FOR YOU TO DO YOUR WORK, TAKE CARE OF THINGS AT HOME, OR GET ALONG WITH OTHER PEOPLE: NOT DIFFICULT AT ALL
6. BECOMING EASILY ANNOYED OR IRRITABLE: NOT AT ALL
2. NOT BEING ABLE TO STOP OR CONTROL WORRYING: SEVERAL DAYS
1. FEELING NERVOUS, ANXIOUS, OR ON EDGE: NOT AT ALL

## 2020-11-10 ASSESSMENT — PATIENT HEALTH QUESTIONNAIRE - PHQ9
SUM OF ALL RESPONSES TO PHQ QUESTIONS 1-9: 4
5. POOR APPETITE OR OVEREATING: NOT AT ALL

## 2020-11-10 NOTE — LETTER
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  This has been deemed an approved intervention for his health care.         Mara WARD, CNP

## 2020-11-10 NOTE — NURSING NOTE
Chief Complaint   Patient presents with     Hospital F/U     Pt is following up from [place].      Video Visit Technology for this patient: Raffy not working, patient has smart device, please try Doximity Video with patient.    Xiomara Pina LPN at 9:34 AM on 11/10/2020.

## 2020-11-10 NOTE — PROGRESS NOTES
"Prasanna Baldwin is a 60 year old male who is being evaluated via a billable video visit.      The patient has been notified of following: This was changed to a telephone visit.     \"This telephone visit will be conducted via a call between you and your physician/provider. We have found that certain health care needs can be provided without the need for an in-person physical exam.  This service lets us provide the care you need with a video conversation.  If a prescription is necessary we can send it directly to your pharmacy.  If lab work is needed we can place an order for that and you can then stop by our lab to have the test done at a later time.    Video visits are billed at different rates depending on your insurance coverage.  Please reach out to your insurance provider with any questions.    If during the course of the call the physician/provider feels a video visit is not appropriate, you will not be charged for this service.\"    Patient has given verbal consent for telephne visit? Yes  How would you like to obtain your AVS? MyChart  If you are dropped from the video visit, the video invite should be resent to: Text to cell phone: [phone]   Will anyone else be joining your video visit? No      Subjective     Prasanna Baldwin is a 60 year old male who presents today via video visit for the following health issues:    HPI   He was hospitalized 10/30-11/3/2020 for symptoms of depression: crying, increased agitation, sleep issues, increased forgetfulness, decreased motivation, forgetfulness. His Bupropion dose was increased to 450 mg a day.  He feels he is doing well. He thought he had an appt.with an [facility] provider today and went to the appt. But learned that he did not have an appt.   He was advised that he could consider acquiring a  dog.  He would like a letter as he is hoping to move to a [facility].    Review of Systems   Not done      Objective         Vitals:  No vitals were obtained today due " to virtual visit.    Physical Exam     GENERAL: Healthy, alert and no distress  RESP: No audible wheeze, cough, or visible cyanosis.  No visible retractions or increased work of breathing.    NEURO:   Mentation and speech appropriate for age.  PSYCH: Mentation appears normal, affect normal/bright, judgement and insight intact, normal speech and appearance well-groomed.    Prasanna was seen today for hospital f/u.    Diagnoses and all orders for this visit:    Adjustment disorder, unspecified type    A letter will be sent recommending a  dog for him.          Telephone visit    Type of service:  telephone  visit  Length of visit. 10minutes.     Platform used for Video Visit:Chandana WARD, CNP

## 2020-11-11 ENCOUNTER — TELEPHONE (OUTPATIENT)
Dept: BEHAVIORAL HEALTH | Facility: CLINIC | Age: 60
End: 2020-11-11

## 2020-11-11 ASSESSMENT — ANXIETY QUESTIONNAIRES: GAD7 TOTAL SCORE: 2

## 2020-11-12 ENCOUNTER — TELEPHONE (OUTPATIENT)
Dept: BEHAVIORAL HEALTH | Facility: CLINIC | Age: 60
End: 2020-11-12

## 2020-11-12 NOTE — TELEPHONE ENCOUNTER
Writer called Pt today to discuss the program since they have not started the lst two program days as scheduled and both days in phone calls stated they were going to join group.  Writer left a message asking for a return call if interested in group.  Will plan to invite to group tomorrow.  If attends, will start in the program.  If note, then will take off the list for now.  Writer left my direct phone number for a bella back number.

## 2020-11-12 NOTE — TELEPHONE ENCOUNTER
"Writer called Pt today during the beginning of the program day to inquire if going to join group.  They stated they were going to.  Writer asked if they had received the invitation and Prasanna said, \"yes\".  Writer said would tell the therapist to expect them to join in the next few minutes.  Offerred to wait on the phone to make sure connected and they declined that offer.   It appears Pt did not join group ever today.  Plan will be to call to determine if interested or if we remove from the list.    "

## 2020-12-17 PROBLEM — F43.21 ADJUSTMENT DISORDER WITH DEPRESSED MOOD: Status: ACTIVE | Noted: 2020-12-17

## 2021-01-11 ENCOUNTER — TELEPHONE (OUTPATIENT)
Dept: NEUROLOGY | Facility: CLINIC | Age: 61
End: 2021-01-11

## 2021-01-11 NOTE — TELEPHONE ENCOUNTER
[facility] Call Center    Phone Message    May a detailed message be left on voicemail: yes     Reason for Call: Other: pt mailed in papers for insurance for disability, pt also would like if you would mail him a copy of what ever you send into the insurance company, please advise with him     Action Taken: Message routed to:  Adult Clinics: Neurology p [id number]    Travel Screening: Not Applicable

## 2021-01-13 NOTE — TELEPHONE ENCOUNTER
Pt called again.  Wants to know if Dr. Marie received paperwork yet?  He needs a copy mailed back to him and 1 mailed to Jenny Horowitz.  Please call him back to let him know if we got it yet.  Thanks.

## 2021-01-13 NOTE — TELEPHONE ENCOUNTER
Forms recvd, completed and placed in Dr. Craig form for signature. I did call pt and ask that he call back and make Follow-up for Sept 2021 so we could write that on his form.    Shikha Cedillo LPN

## 2021-01-26 NOTE — TELEPHONE ENCOUNTER
Completed and signed form faxed to formerly [facility] [phone]. Copy sent to scanning and original sent to pt.  Message left for patient to call us back so I could let him know the information above.   Shikha Cedillo LPN

## 2021-02-01 DIAGNOSIS — F43.20 ADJUSTMENT DISORDER, UNSPECIFIED TYPE: ICD-10-CM

## 2021-02-03 RX ORDER — TRAZODONE HYDROCHLORIDE 100 MG/1
100 TABLET ORAL
Qty: 90 TABLET | Refills: 0 | Status: SHIPPED | OUTPATIENT
Start: 2021-02-03 | End: 2022-11-14

## 2021-02-08 ENCOUNTER — TRANSFERRED RECORDS (OUTPATIENT)
Dept: HEALTH INFORMATION MANAGEMENT | Facility: CLINIC | Age: 61
End: 2021-02-08

## 2021-03-01 ENCOUNTER — IMMUNIZATION (OUTPATIENT)
Dept: NURSING | Facility: CLINIC | Age: 61
End: 2021-03-01
Payer: COMMERCIAL

## 2021-03-01 PROCEDURE — 91300 PR COVID VAC PFIZER DIL RECON 30 MCG/0.3 ML IM: CPT

## 2021-03-01 PROCEDURE — 0001A PR COVID VAC PFIZER DIL RECON 30 MCG/0.3 ML IM: CPT

## 2021-03-18 ENCOUNTER — OFFICE VISIT (OUTPATIENT)
Dept: INTERNAL MEDICINE | Facility: CLINIC | Age: 61
End: 2021-03-18
Payer: COMMERCIAL

## 2021-03-18 ENCOUNTER — ANCILLARY PROCEDURE (OUTPATIENT)
Dept: GENERAL RADIOLOGY | Facility: CLINIC | Age: 61
End: 2021-03-18
Attending: NURSE PRACTITIONER
Payer: COMMERCIAL

## 2021-03-18 VITALS
BODY MASS INDEX: 22.3 KG/M2 | SYSTOLIC BLOOD PRESSURE: 109 MMHG | HEART RATE: 73 BPM | WEIGHT: 169 LBS | OXYGEN SATURATION: 98 % | DIASTOLIC BLOOD PRESSURE: 68 MMHG

## 2021-03-18 DIAGNOSIS — Z12.11 SCREENING FOR COLON CANCER: ICD-10-CM

## 2021-03-18 DIAGNOSIS — Z13.0 SCREENING FOR DEFICIENCY ANEMIA: ICD-10-CM

## 2021-03-18 DIAGNOSIS — Z70.8 SEXUALLY TRANSMITTED DISEASE COUNSELING: ICD-10-CM

## 2021-03-18 DIAGNOSIS — Z13.1 SCREENING FOR DIABETES MELLITUS: ICD-10-CM

## 2021-03-18 DIAGNOSIS — M25.512 CHRONIC LEFT SHOULDER PAIN: ICD-10-CM

## 2021-03-18 DIAGNOSIS — G89.29 CHRONIC LEFT SHOULDER PAIN: Primary | ICD-10-CM

## 2021-03-18 DIAGNOSIS — G89.29 CHRONIC LEFT SHOULDER PAIN: ICD-10-CM

## 2021-03-18 DIAGNOSIS — M25.512 CHRONIC LEFT SHOULDER PAIN: Primary | ICD-10-CM

## 2021-03-18 LAB
ERYTHROCYTE [DISTWIDTH] IN BLOOD BY AUTOMATED COUNT: 12.7 % (ref 10–15)
HBA1C MFR BLD: 5.5 % (ref 0–5.6)
HCT VFR BLD AUTO: 40.7 % (ref 40–53)
HGB BLD-MCNC: 13.4 G/DL (ref 13.3–17.7)
MCH RBC QN AUTO: 30.2 PG (ref 26.5–33)
MCHC RBC AUTO-ENTMCNC: 32.9 G/DL (ref 31.5–36.5)
MCV RBC AUTO: 92 FL (ref 78–100)
PLATELET # BLD AUTO: 191 10E9/L (ref 150–450)
RBC # BLD AUTO: 4.43 10E12/L (ref 4.4–5.9)
WBC # BLD AUTO: 5 10E9/L (ref 4–11)

## 2021-03-18 PROCEDURE — 73030 X-RAY EXAM OF SHOULDER: CPT | Mod: LT | Performed by: RADIOLOGY

## 2021-03-18 PROCEDURE — 83036 HEMOGLOBIN GLYCOSYLATED A1C: CPT | Performed by: PATHOLOGY

## 2021-03-18 PROCEDURE — 85027 COMPLETE CBC AUTOMATED: CPT | Performed by: PATHOLOGY

## 2021-03-18 PROCEDURE — 99214 OFFICE O/P EST MOD 30 MIN: CPT | Performed by: NURSE PRACTITIONER

## 2021-03-18 PROCEDURE — 36415 COLL VENOUS BLD VENIPUNCTURE: CPT | Performed by: PATHOLOGY

## 2021-03-18 PROCEDURE — 82274 ASSAY TEST FOR BLOOD FECAL: CPT | Performed by: NURSE PRACTITIONER

## 2021-03-18 ASSESSMENT — PAIN SCALES - GENERAL: PAINLEVEL: NO PAIN (0)

## 2021-03-18 NOTE — NURSING NOTE
A vision test was performed today in clinic at the request of Mara Beard. The results are as follows:     Right eye: 20/25    Left eye: 20/25    Both eyes: 20/20    Alec Ramsey CMA 12:20 PM sign on 3/18/2021

## 2021-03-18 NOTE — PATIENT INSTRUCTIONS
Suppressive therapy:  Antiviral regimens -- The 2015 CDC guidelines recommend the following oral treatment options (doses based on normal renal function) [3]:  ?Acyclovir: 400 mg twice daily  ?Famciclovir: 250 mg twice daily  ?Valacyclovir: 500 mg once daily or 1000 mg once daily  We prefer valacyclovir 500 mg once daily for most patients; however, for patients with >10 recurrences annually, we use valacyclovir 1000 mg once daily. In a randomized trial of valacyclovir for suppression of recurrent genital herpes in 1479 immunocompetent patients with a history of >10 recurrences per year, 1 g of valacyclovir once daily was more effective than 500 mg daily in preventing or delaying recurrences..   One direct comparative study suggested a modestly increased efficacy of valacyclovir compared with famciclovir for suppressive therapy. However, in a meta-analysis of 6158 immunocompetent patients from 14 randomized placebo-controlled trials, the efficacy of acyclovir (400 mg twice daily), valacyclovir (250 mg twice daily), famciclovir (250 mg twice daily), and once-daily valacyclovir (500 mg) were comparable [27]. In this study, suppressive antiviral therapy lowered the relative risk (RR) of developing at least one recurrence compared with placebo (RR of 47 percent, 95% CI 45-49 percent).    Follow-up -- We discuss the need for ongoing suppressive antiviral therapy on an annual basis since the number of recurrences diminishes with time, whether or not antiviral therapy is administered . In addition, there are limited data on the long-term risks and benefits of antiviral therapy. Although many patients have been on chronic therapy for over a decade, the safety of suppressive therapy has only been demonstrated in one study that followed 239 patients for over six years . Additional information on the risk of recurrence and the clinical trial data evaluating suppressive antiviral therapy is found above.     Despite the lack of  data, many patients choose to continue long-term treatment due to the relative safety of the antiviral agents and concerns about relapses during treatment interruptions. In one study of 71 patients who were treated with acyclovir for 12 months, symptoms recurred within four weeks of stopping therapy in 69 percent of patients [48].

## 2021-03-18 NOTE — PROGRESS NOTES
S:     Prasanna presents to discuss  Issues associated with protection against STDs.  He has renewed a friendship with a previous acquaintance and it is apparent it will be an intimate relationship.  His concern is that she has a hx of genital herpes.  She does not have outbreaks often but he wants more info regarding avoidance.      His left shoulder has been sore and he would like an xray due to mobility issues.  He is weaning off estrogen.     He is due for colon cancer screening.     He is enjoying his new condo.  Gave dog back to the pound as it was too big for his condo.  May get a smaller dog in the future.   He would like to return to work in some capacity.  His law suit is still pending with the [employer].   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    Patient Active Problem List   Diagnosis     Scapulothoracic syndrome     Gastroesophageal reflux disease without esophagitis     Klinefelter's syndrome diagnosed 12/1980     Gender dysphoria     Adjustment disorder with depressed mood            Past Medical History:   Diagnosis Date     Adjustment disorder with depressed mood 12/17/2020     Depression      Male-to-female transgender person 12/3/2018     Right rib fracture     9th rib, non-displaced.             Past Surgical History:   Procedure Laterality Date     ARTHROSCOPIC RECONSTRUCTION ANTERIOR CRUCIATE LIGAMENT  8/1/2001     CHOLECYSTECTOMY  8/23/2006            Social History     Tobacco Use     Smoking status: Light Tobacco Smoker     Types: Pipe     Smokeless tobacco: Never Used     Tobacco comment: 1 pipe/day   Substance Use Topics     Alcohol use: Not Currently     Frequency: Never     Comment: occ beer            Family History   Problem Relation Age of Onset     Rheumatoid Arthritis Mother      Diabetes Type 2  Father      Dementia Father      Abdominal Aortic Aneurysm Father      Pancreatic Cancer Father      Prostate Cancer Father       "Cancer Paternal Uncle         testicular               Allergies   Allergen Reactions     Nkda [No Known Drug Allergies]             Current Outpatient Medications   Medication Sig Dispense Refill     buPROPion (WELLBUTRIN XL) 150 MG 24 hr tablet Take 150 mg by mouth every morning With 300mg for total of 450mg       buPROPion (WELLBUTRIN XL) 300 MG 24 hr tablet Take 300 mg by mouth every morning With 150mg for total of 450mg       estradiol (ESTRACE) 2 MG tablet Take 4 mg by mouth daily   0     Multiple Vitamins-Minerals (EMERGEN-C IMMUNE PO) Take 1 tablet by mouth daily       progesterone (PROMETRIUM) 100 MG capsule Take 200 mg by mouth daily   1     traZODone (DESYREL) 100 MG tablet Take 1 tablet (100 mg) by mouth nightly as needed for sleep 90 tablet 0        REVIEW OF SYSTEMS:   He is the happiest he has been \"in a long time.\"   See HPI.    O:   /68 (BP Location: Right arm, Patient Position: Sitting, Cuff Size: Adult Regular)   Pulse 73   Wt 76.7 kg (169 lb)   SpO2 98%   BMI 22.30 kg/m    GENERAL APPEARANCE: healthy, alert and no distress  EYES: normal grossly.  HENT: ear canals and TM's normal without wax accumulation.  RESP: lungs clear to auscultation - no rales, rhonchi or wheezes  CV: regular rates and rhythm, normal S1 S2, no S3 or S4 and no murmur, click or rub   NEURO: normal.  MUSK: not examined.  SKIN: normal  EXT: warm.  Edema NO   PSYCHE: Happy    A/P:  Prasanna was seen today for recheck medication.    Diagnoses and all orders for this visit:    Chronic left shoulder pain  -     XR Shoulder Left 2 Views; Future    Screening for diabetes mellitus  -     Hemoglobin A1c; Future    Screening for deficiency anemia  -     CBC with platelets; Future    Screening for colon cancer  -     Fecal cancer screen FIT; Future    STI Prevention       -     We discussed the nature of genital herpes and prevention including medication for suppression.  He was given printed information. He felt knowledgeable to " discuss this with his partner.     Mara WARD, CNP

## 2021-03-18 NOTE — NURSING NOTE
Chief Complaint   Patient presents with     Recheck Medication     pt here to follow up, blood work, xray for shoulder       Alec Ramsey CMA, EMT at 11:00 AM on 3/18/2021.

## 2021-03-19 ENCOUNTER — MYC MEDICAL ADVICE (OUTPATIENT)
Dept: INTERNAL MEDICINE | Facility: CLINIC | Age: 61
End: 2021-03-19

## 2021-03-19 PROBLEM — Z78.9 MALE-TO-FEMALE TRANSGENDER PERSON: Status: RESOLVED | Noted: 2018-12-03 | Resolved: 2021-03-19

## 2021-03-22 ENCOUNTER — IMMUNIZATION (OUTPATIENT)
Dept: NURSING | Facility: CLINIC | Age: 61
End: 2021-03-22
Attending: INTERNAL MEDICINE
Payer: COMMERCIAL

## 2021-03-22 LAB — HEMOCCULT STL QL IA: NEGATIVE

## 2021-03-22 PROCEDURE — 0002A PR COVID VAC PFIZER DIL RECON 30 MCG/0.3 ML IM: CPT

## 2021-03-22 PROCEDURE — 91300 PR COVID VAC PFIZER DIL RECON 30 MCG/0.3 ML IM: CPT

## 2021-04-05 ENCOUNTER — PATIENT OUTREACH (OUTPATIENT)
Dept: CARE COORDINATION | Facility: CLINIC | Age: 61
End: 2021-04-05

## 2021-04-05 NOTE — PROGRESS NOTES
Clinical Product Navigator RN reviewed chart; patient on payer product coverage.    Patient has an established care team, recent completed office visit and preventive screening labs.     This writer will send patient an introduction letter introducing to clinical product navigator to assist in any current or future care needs and care navigation.     Zaida Conteh RN/Clinical Product Navigator

## 2021-06-12 NOTE — PROGRESS NOTES
[facility]  B: pt ongoing worsening depression several months. [place] Pt constant thghts of death, Si but with no specific plan. Pt started transitioning from male to female before covid hit.  Procedure in august was cancelled. Pt has now stopped hormone therapy.  Pt states crying all the time, anhedonic, isolating. Life stressors include son moved to college, ex wife moved to [place].   A: dx depression.  Calm, cooperative, vol. Asymptomatic, covid test pending.   R: [id number]  Patient cleared and ready for behavioral bed placement: Yes

## 2021-08-05 ENCOUNTER — ANCILLARY PROCEDURE (OUTPATIENT)
Dept: MAMMOGRAPHY | Facility: CLINIC | Age: 61
End: 2021-08-05
Payer: COMMERCIAL

## 2021-08-05 DIAGNOSIS — Z12.31 VISIT FOR SCREENING MAMMOGRAM: ICD-10-CM

## 2021-08-05 PROCEDURE — 77067 SCR MAMMO BI INCL CAD: CPT | Performed by: RADIOLOGY

## 2021-08-16 ENCOUNTER — TELEPHONE (OUTPATIENT)
Dept: INTERNAL MEDICINE | Facility: CLINIC | Age: 61
End: 2021-08-16

## 2021-08-16 NOTE — TELEPHONE ENCOUNTER
----- Message from Lana Ware RN sent at 8/16/2021  6:51 AM CDT -----  Regarding: FW: pt needs an appointment    ----- Message -----  From: Mara Beard APRN CNP  Sent: 8/13/2021  10:58 PM CDT  To: Gold Team  Subject: pt needs an appointment                          Please schedule him with me in the next few weeks.    Mara WARD, CNP

## 2021-08-16 NOTE — TELEPHONE ENCOUNTER
Call patient to help schedule appointment for follow up with Mara Beard in a few weeks. No answered. Left voice message with [facility] number asking patient to call back to schedule with PCP per provider request.

## 2021-08-19 ENCOUNTER — OFFICE VISIT (OUTPATIENT)
Dept: INTERNAL MEDICINE | Facility: CLINIC | Age: 61
End: 2021-08-19
Payer: COMMERCIAL

## 2021-08-19 ENCOUNTER — ANCILLARY PROCEDURE (OUTPATIENT)
Dept: MAMMOGRAPHY | Facility: CLINIC | Age: 61
End: 2021-08-19
Attending: NURSE PRACTITIONER
Payer: COMMERCIAL

## 2021-08-19 VITALS
WEIGHT: 177 LBS | DIASTOLIC BLOOD PRESSURE: 86 MMHG | HEART RATE: 85 BPM | SYSTOLIC BLOOD PRESSURE: 130 MMHG | OXYGEN SATURATION: 98 % | BODY MASS INDEX: 23.35 KG/M2

## 2021-08-19 DIAGNOSIS — N64.4 BREAST PAIN: Primary | ICD-10-CM

## 2021-08-19 PROCEDURE — 99214 OFFICE O/P EST MOD 30 MIN: CPT | Performed by: NURSE PRACTITIONER

## 2021-08-19 PROCEDURE — 76642 ULTRASOUND BREAST LIMITED: CPT | Mod: LT

## 2021-08-19 NOTE — PROGRESS NOTES
S:     Prasanna is concerned because on breast exam Prasanna feels there are subareolar masses bilaterally and since having a mammogram the breasts are more tender. Prasanna continues to take hormones.     Prasanna stopped smoking cigarettes one month ago.    Patient Active Problem List   Diagnosis     Scapulothoracic syndrome     Gastroesophageal reflux disease without esophagitis     Klinefelter's syndrome diagnosed 12/1980     Gender dysphoria     Adjustment disorder with depressed mood            Past Medical History:   Diagnosis Date     Adjustment disorder with depressed mood 12/17/2020     Alcohol abuse     Hx     Depression      Depression      Gender dysphoria      GERD (gastroesophageal reflux disease)      Klinefelter syndrome      Male-to-female transgender person 12/3/2018     Right rib fracture     9th rib, non-displaced.      Scapulothoracic syndrome             Past Surgical History:   Procedure Laterality Date     ARTHROSCOPIC RECONSTRUCTION ANTERIOR CRUCIATE LIGAMENT  8/1/2001     ARTHROSCOPIC REPAIR ACL       CHOLECYSTECTOMY  8/23/2006     CHOLECYSTECTOMY              Social History     Tobacco Use     Smoking status: Light Tobacco Smoker     Types: Pipe     Smokeless tobacco: Never Used     Tobacco comment: 1 pipe/day   Substance Use Topics     Alcohol use: Not Currently     Comment: occ beer            Family History   Problem Relation Age of Onset     Rheumatoid Arthritis Mother      Diabetes Type 2  Father      Dementia Father      Abdominal Aortic Aneurysm Father      Pancreatic Cancer Father      Prostate Cancer Father      Cancer Paternal Uncle         testicular               Allergies   Allergen Reactions     Nkda [No Known Drug Allergies]             Current Outpatient Medications   Medication Sig Dispense Refill     buPROPion (WELLBUTRIN XL) 150 MG 24 hr tablet Take 150 mg by mouth every morning With 300mg for total of 450mg       buPROPion (WELLBUTRIN XL) 300 MG 24 hr tablet Take 300 mg by mouth every  morning With 150mg for total of 450mg       estradiol (ESTRACE) 2 MG tablet Take 4 mg by mouth daily   0     Multiple Vitamins-Minerals (EMERGEN-C IMMUNE PO) Take 1 tablet by mouth daily       progesterone (PROMETRIUM) 100 MG capsule Take 200 mg by mouth daily   1     traZODone (DESYREL) 100 MG tablet Take 1 tablet (100 mg) by mouth nightly as needed for sleep 90 tablet 0        REVIEW OF SYSTEMS:  See above.    O:   BP (!) 148/89 (BP Location: Left arm, Patient Position: Sitting, Cuff Size: Adult Regular)   Pulse 85   Wt 80.3 kg (177 lb)   SpO2 98%   BMI 23.35 kg/m    GENERAL APPEARANCE: healthy, alert and no distress  EYES: Grossly normal  NEURO:Grossly normal  MUSK: Grossly normal  LYMPH:Axilla w/o adenopathy  BREASTS: no palpable masses.  There is slightly thickened tissue beneath earch areola. Nipples are pierced bilaterally.  EXT: warm.  Edema NO   PSYCHE: alert, oriented.    A/P:  Prasanna was seen today for mass.    Diagnoses and all orders for this visit:    Breast pain  -     US Breast Bilateral Limited 1-3 Quadrants.  Results will be sent when available.     Total time spent today with this patient including chart review, exam time with patient and documentation :   30 minutes.    Mara WARD, CNP

## 2021-08-19 NOTE — NURSING NOTE
Chief Complaint   Patient presents with     Mass     pt here to radha lump in right breast, painful, noticed 2 weeks ago       Alec Ramsey CMA, EMT at 8:17 AM on 8/19/2021.

## 2021-09-05 ENCOUNTER — HEALTH MAINTENANCE LETTER (OUTPATIENT)
Age: 61
End: 2021-09-05

## 2021-10-19 ENCOUNTER — OFFICE VISIT (OUTPATIENT)
Dept: NEUROLOGY | Facility: CLINIC | Age: 61
End: 2021-10-19
Payer: COMMERCIAL

## 2021-10-19 VITALS
DIASTOLIC BLOOD PRESSURE: 82 MMHG | HEART RATE: 82 BPM | WEIGHT: 181 LBS | RESPIRATION RATE: 16 BRPM | OXYGEN SATURATION: 100 % | BODY MASS INDEX: 23.88 KG/M2 | SYSTOLIC BLOOD PRESSURE: 117 MMHG

## 2021-10-19 DIAGNOSIS — G56.80 SCAPULOTHORACIC SYNDROME: Primary | ICD-10-CM

## 2021-10-19 PROCEDURE — 99212 OFFICE O/P EST SF 10 MIN: CPT | Performed by: PSYCHIATRY & NEUROLOGY

## 2021-10-19 NOTE — LETTER
10/19/2021         RE: Prasanna Irizarry  [address]  [place] [address]        Dear Colleague,    Thank you for referring your patient, Prasanna Irizarry, to the [facility] [place]. Please see a copy of my visit note below.    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 Irizarry previously for management of scapulothoracic dysfunction.  Prasanna reports that his symptoms in his right upper limb are unchanged.  He is now developing pain in the left shoulder, particularly when sleeping on his left side.    Examination demonstrates normal tone, bulk, strength and rapid repetitive movements, except for inability to bring the right hand away from the back.  Reflexes are preserved.    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  I have made a referral to an orthopedic shoulder specialist at the [place] for further assessment of both shoulders.  He will return in 1 year or as needed.    Dain Marie MD        D: 10/19/2021   T: 10/19/2021   MT: [facility]    Name:     PRASANNA IRIZARRY  MRN:      [id number]        Account:    [id number]   :      1960           Visit Date: 10/19/2021     Document: [id number]    cc:  Mara Beard NP     13 minutes spent on the date of the encounter on chart review, history and examination, documentation and further activities as noted above.        Again, thank you for allowing me to participate in the care of your patient.        Sincerely,        Dain Marie MD

## 2021-10-20 NOTE — PROGRESS NOTES
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 Irizarry previously for management of scapulothoracic dysfunction.  Prasanna reports that his symptoms in his right upper limb are unchanged.  He is now developing pain in the left shoulder, particularly when sleeping on his left side.    Examination demonstrates normal tone, bulk, strength and rapid repetitive movements, except for inability to bring the right hand away from the back.  Reflexes are preserved.    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  I have made a referral to an orthopedic shoulder specialist at the [facility] for further assessment of both shoulders.  He will return in 1 year or as needed.    Dain Marie MD        D: 10/19/2021   T: 10/19/2021   MT: [facility]    Name:     PRASANNA IRIZARRY  MRN:      [id number]        Account:    [id number]   :      1960           Visit Date: 10/19/2021     Document: [id number]    cc:  Mara Beard NP     13 minutes spent on the date of the encounter on chart review, history and examination, documentation and further activities as noted above.

## 2021-10-21 NOTE — TELEPHONE ENCOUNTER
RECORDS RECEIVED FROM: Scapulothoracic syndrome- right, left shoulder pain/XR/Dain Marie MD/B+/[facility]  emailed pt appt reminder   DATE RECEIVED: Nov 24, 2021     NOTES STATUS DETAILS   OFFICE NOTE from referring provider Internal  Dain Marie MD        MEDICATION LIST Internal    XRAYS (IMAGES & REPORTS) Internal 3/18/21

## 2021-11-22 DIAGNOSIS — M25.512 BILATERAL SHOULDER PAIN, UNSPECIFIED CHRONICITY: Primary | ICD-10-CM

## 2021-11-22 DIAGNOSIS — M25.511 BILATERAL SHOULDER PAIN, UNSPECIFIED CHRONICITY: Primary | ICD-10-CM

## 2021-11-24 ENCOUNTER — ANCILLARY PROCEDURE (OUTPATIENT)
Dept: GENERAL RADIOLOGY | Facility: CLINIC | Age: 61
End: 2021-11-24
Attending: ORTHOPAEDIC SURGERY
Payer: COMMERCIAL

## 2021-11-24 ENCOUNTER — OFFICE VISIT (OUTPATIENT)
Dept: ORTHOPEDICS | Facility: CLINIC | Age: 61
End: 2021-11-24
Payer: COMMERCIAL

## 2021-11-24 ENCOUNTER — PRE VISIT (OUTPATIENT)
Dept: ORTHOPEDICS | Facility: CLINIC | Age: 61
End: 2021-11-24

## 2021-11-24 VITALS — HEIGHT: 75 IN | WEIGHT: 184.7 LBS | BODY MASS INDEX: 22.97 KG/M2

## 2021-11-24 DIAGNOSIS — M25.512 BILATERAL SHOULDER PAIN, UNSPECIFIED CHRONICITY: ICD-10-CM

## 2021-11-24 DIAGNOSIS — M19.011 ARTHRITIS OF SHOULDER REGION, RIGHT: Primary | ICD-10-CM

## 2021-11-24 DIAGNOSIS — M25.511 BILATERAL SHOULDER PAIN, UNSPECIFIED CHRONICITY: ICD-10-CM

## 2021-11-24 PROCEDURE — 99204 OFFICE O/P NEW MOD 45 MIN: CPT | Performed by: ORTHOPAEDIC SURGERY

## 2021-11-24 PROCEDURE — 73030 X-RAY EXAM OF SHOULDER: CPT | Mod: LT | Performed by: RADIOLOGY

## 2021-11-24 RX ORDER — SPIRONOLACTONE 50 MG/1
100 TABLET, FILM COATED ORAL DAILY
COMMUNITY
Start: 2021-10-24 | End: 2022-08-09

## 2021-11-24 ASSESSMENT — MIFFLIN-ST. JEOR: SCORE: 1717.16

## 2021-11-24 NOTE — PROGRESS NOTES
CHIEF CONCERN:  Right shoulder pain    HISTORY OF PRESENT ILLNESS:  Alma is a 61 year old who is seen today for right shoulder pain. She had a right shoulder surgery in 1999 with Dr. Martin and she reports that she struggled with limited motion/stiffness ever since. She has been seeing Dr. 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 In the past 6 months or so she has had more pain and this is now interrupting sleep.     Past Medical History:   Diagnosis Date     Adjustment disorder with depressed mood 12/17/2020     Alcohol abuse     Hx     Depression      Depression      Gender dysphoria      GERD (gastroesophageal reflux disease)      Klinefelter syndrome      Male-to-female transgender person 12/3/2018     Right rib fracture     9th rib, non-displaced.      Scapulothoracic syndrome        Past Surgical History:   Procedure Laterality Date     ARTHROSCOPIC RECONSTRUCTION ANTERIOR CRUCIATE LIGAMENT  8/1/2001     ARTHROSCOPIC REPAIR ACL       CHOLECYSTECTOMY  8/23/2006     CHOLECYSTECTOMY         Current Outpatient Medications   Medication Sig Dispense Refill     buPROPion (WELLBUTRIN XL) 150 MG 24 hr tablet Take 150 mg by mouth every morning With 300mg for total of 450mg       buPROPion (WELLBUTRIN XL) 300 MG 24 hr tablet Take 300 mg by mouth every morning With 150mg for total of 450mg       estradiol (ESTRACE) 2 MG tablet Take 4 mg by mouth daily   0     Multiple Vitamins-Minerals (EMERGEN-C IMMUNE PO) Take 1 tablet by mouth daily       progesterone (PROMETRIUM) 100 MG capsule Take 200 mg by mouth daily   1     spironolactone (ALDACTONE) 50 MG tablet Take 50 mg by mouth daily       traZODone (DESYREL) 100 MG tablet Take 1 tablet (100 mg) by mouth nightly as needed for sleep 90 tablet 0          Allergies   Allergen Reactions     Nkda [No Known Drug Allergies]        SOCIAL HISTORY:    Social History      Socioeconomic History     Marital status:      Spouse name: Not on file     Number of children: Not on file     Years of education: Not on file     Highest education level: Not on file   Occupational History     Not on file   Tobacco Use     Smoking status: Former Smoker     Types: Pipe, Cigarettes     Quit date: 2021     Years since quittin.3     Smokeless tobacco: Never Used     Tobacco comment: 1 pipe/day   Substance and Sexual Activity     Alcohol use: Not Currently     Comment: occ beer     Drug use: No     Sexual activity: Yes     Partners: Female   Other Topics Concern      Service No     Blood Transfusions No     Caffeine Concern No     Occupational Exposure No     Hobby Hazards No     Sleep Concern Yes     Stress Concern No     Weight Concern No     Special Diet No     Back Care No     Exercise Yes     Comment: runs and works out 6 days/week     Bike Helmet Not Asked     Seat Belt Yes     Self-Exams Yes     Parent/sibling w/ CABG, MI or angioplasty before 65F 55M? 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      Social Determinants of Health     Financial Resource Strain: Not on file   Food Insecurity: Not on file   Transportation Needs: Not on file   Physical Activity: Not on file   Stress: Not on file   Social Connections: Not on file   Intimate Partner Violence: Not on file   Housing Stability: Not on file       FAMILY HISTORY: Reviewed in EMR      REVIEW OF SYSTEMS: Positive for that noted in past medical history and history of present illness and otherwise reviewed in EMR     PHYSICAL EXAM:    Adult in no acute distress. Articulates and communicates with normal affect. Seen with her girlfriend today.  Respirations even and unlabored  Focused upper extremity exam: Skin intact. No erythema. Sensation intact all dermatomes into the hand to light touch. EPL, FPL, and Intrinsics  intact. Right shoulder active motion is FE to 155 but with scapulothoracic substitution, ER at side to 5, and IR to L2. Left shoulder active motion is FE to 170, ER to 40, and IR to T12.  No pain on palpation over the AC joint.     IMAGING:  Bilateral shoulder XRs were reviewed and these demonstrate complete loss of the glenohumeral joint space on the right with a sizeable ring humeral osteophyte. There is narrowing of the glenohumeral space on the left but without any significant osteophyte formation.    ASSESSMENT:    1. Right end stage glenohumeral arthrosis  2. Left early glenohumeral arthrosis  3. Status post right shoulder labral repair    PLAN:  We discussed the imaging findings and treatment options. We discussed the finding of advanced shoulder arthritis. We discussed the non-operative and operative treatment options including the risks and potential benefits of each. These include activity modifications, cortisone injections, and PT for non-operative management, and total shoulder arthroplasty as the operative treatment option. We discussed the expectations for pain relief and/or motion improvement with each option. She does not feel that non-operatively management thus far has adequately improved symptoms and pain is very limiting at this time. She is not interested in cortisone injections. She believes she would like to proceed with surgical treatment. We reviewed postoperative rehab protocols and expectations. She would like to proceed with surgical scheduling and we will work with her in this regard. A preoperative MRI will be obtained.      Dulce Neely MD

## 2021-11-24 NOTE — NURSING NOTE
"Reason For Visit:   Chief Complaint   Patient presents with     Consult     Right Scapulothoracic syndrome, and Left shoulder pain  ref. Dr. Marie       PCP: Mara Beard  Ref: Dr. Marie    ?  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  Work status?  Part-time.  Date of injury: chronic wear and tear  Type of injury: none.  Date of surgery: 1999 with Dr. Martin  Type of surgery: Right shoulder labral repair - labrum was floating and screws were placed.  Smoker: No  Request smoking cessation information: No    RIght hand dominant    SANE score  Affected shoulder: Right  Right shoulder SANE: 40 worse at night  Left shoulder SANE: 30 worse at night    Ht 1.895 m (6' 2.61\")   Wt 83.8 kg (184 lb 11.2 oz)   BMI 23.33 kg/m        Pain Assessment  Patient Currently in Pain: Denies (pain with laying on sides, and behind the back movment.  they also creak)    Venus Swanson ATC        " Biopsy Type: H and E Depth Of Biopsy: dermis Dressing: pressure dressing with telfa Bill For Surgical Tray: no Render Post-Care Instructions In Note?: yes Biopsy Method: Dermablade Curettage Text: The wound bed was treated with curettage after the biopsy was performed. Lab: [facility] Electrodesiccation Text: The wound bed was treated with electrodesiccation after the biopsy was performed. Detail Level: Detailed Electrodesiccation And Curettage Text: The wound bed was treated with electrodesiccation and curettage after the biopsy was performed. Wound Care: Bacitracin X Size Of Lesion In Cm: 0 Notification Instructions: Patient will be notified of biopsy results. However, patient instructed to call the office if not contacted within 2 weeks. Type Of Destruction Used: Electrodesiccation and Curettage Lab Facility: [facility] Anesthesia Volume In Cc (Will Not Render If 0): 0.5 Consent: Written consent was obtained and risks were reviewed including but not limited to scarring, infection, bleeding, scabbing, incomplete removal, nerve damage and allergy to anesthesia. Hemostasis: Drysol Size Of Lesion In Cm: 1.3 Cryotherapy Text: The wound bed was treated with cryotherapy after the biopsy was performed. Post-Care Instructions: I reviewed with the patient in detail post-care instructions. Patient is to keep the biopsy site dry overnight, and then apply bacitracin twice daily until healed. Patient may apply hydrogen peroxide soaks to remove any crusting. Billing Type: [employer] Anesthesia Type: 1% lidocaine with epinephrine Silver Nitrate Text: The wound bed was treated with silver nitrate after the biopsy was performed.

## 2021-11-24 NOTE — LETTER
11/24/2021         RE: Prasanna Baldwin  [address]  [place] [address]        Dear Colleague,    Thank you for referring your patient, Prasanna Baldwin, to the [facility] [place]. Please see a copy of my visit note below.    CHIEF CONCERN:  Right shoulder pain    HISTORY OF PRESENT ILLNESS:  Alma is a 61 year old who is seen today for right shoulder pain. She had a right shoulder surgery in 1999 with Dr. Martin and she reports that she struggled with limited motion/stiffness ever since. She has been seeing Dr. 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 In the past 6 months or so she has had more pain and this is now interrupting sleep.     Past Medical History:   Diagnosis Date     Adjustment disorder with depressed mood 12/17/2020     Alcohol abuse     Hx     Depression      Depression      Gender dysphoria      GERD (gastroesophageal reflux disease)      Klinefelter syndrome      Male-to-female transgender person 12/3/2018     Right rib fracture     9th rib, non-displaced.      Scapulothoracic syndrome        Past Surgical History:   Procedure Laterality Date     ARTHROSCOPIC RECONSTRUCTION ANTERIOR CRUCIATE LIGAMENT  8/1/2001     ARTHROSCOPIC REPAIR ACL       CHOLECYSTECTOMY  8/23/2006     CHOLECYSTECTOMY         Current Outpatient Medications   Medication Sig Dispense Refill     buPROPion (WELLBUTRIN XL) 150 MG 24 hr tablet Take 150 mg by mouth every morning With 300mg for total of 450mg       buPROPion (WELLBUTRIN XL) 300 MG 24 hr tablet Take 300 mg by mouth every morning With 150mg for total of 450mg       estradiol (ESTRACE) 2 MG tablet Take 4 mg by mouth daily   0     Multiple Vitamins-Minerals (EMERGEN-C IMMUNE PO) Take 1 tablet by mouth daily       progesterone (PROMETRIUM) 100 MG capsule Take 200 mg by mouth daily   1     spironolactone (ALDACTONE) 50  MG tablet Take 50 mg by mouth daily       traZODone (DESYREL) 100 MG tablet Take 1 tablet (100 mg) by mouth nightly as needed for sleep 90 tablet 0          Allergies   Allergen Reactions     Nkda [No Known Drug Allergies]        SOCIAL HISTORY:    Social History     Socioeconomic History     Marital status:      Spouse name: Not on file     Number of children: Not on file     Years of education: Not on file     Highest education level: Not on file   Occupational History     Not on file   Tobacco Use     Smoking status: Former Smoker     Types: Pipe, Cigarettes     Quit date: 2021     Years since quittin.3     Smokeless tobacco: Never Used     Tobacco comment: 1 pipe/day   Substance and Sexual Activity     Alcohol use: Not Currently     Comment: occ beer     Drug use: No     Sexual activity: Yes     Partners: Female   Other Topics Concern      Service No     Blood Transfusions No     Caffeine Concern No     Occupational Exposure No     Hobby Hazards No     Sleep Concern Yes     Stress Concern No     Weight Concern No     Special Diet No     Back Care No     Exercise Yes     Comment: runs and works out 6 days/week     Bike Helmet Not Asked     Seat Belt Yes     Self-Exams Yes     Parent/sibling w/ CABG, MI or angioplasty before 65F 55M? 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      Social Determinants of Health     Financial Resource Strain: Not on file   Food Insecurity: Not on file   Transportation Needs: Not on file   Physical Activity: Not on file   Stress: Not on file   Social Connections: Not on file   Intimate Partner Violence: Not on file   Housing Stability: Not on file       FAMILY HISTORY: Reviewed in EMR      REVIEW OF SYSTEMS: Positive for that noted in past medical history and history of present illness and otherwise reviewed in EMR     PHYSICAL EXAM:    Adult in no acute  distress. Articulates and communicates with normal affect. Seen with her girlfriend today.  Respirations even and unlabored  Focused upper extremity exam: Skin intact. No erythema. Sensation intact all dermatomes into the hand to light touch. EPL, FPL, and Intrinsics intact. Right shoulder active motion is FE to 155 but with scapulothoracic substitution, ER at side to 5, and IR to L2. Left shoulder active motion is FE to 170, ER to 40, and IR to T12.  No pain on palpation over the AC joint.     IMAGING:  Bilateral shoulder XRs were reviewed and these demonstrate complete loss of the glenohumeral joint space on the right with a sizeable ring humeral osteophyte. There is narrowing of the glenohumeral space on the left but without any significant osteophyte formation.    ASSESSMENT:    1. Right end stage glenohumeral arthrosis  2. Left early glenohumeral arthrosis  3. Status post right shoulder labral repair    PLAN:  We discussed the imaging findings and treatment options. We discussed the finding of advanced shoulder arthritis. We discussed the non-operative and operative treatment options including the risks and potential benefits of each. These include activity modifications, cortisone injections, and PT for non-operative management, and total shoulder arthroplasty as the operative treatment option. We discussed the expectations for pain relief and/or motion improvement with each option. She does not feel that non-operatively management thus far has adequately improved symptoms and pain is very limiting at this time. She is not interested in cortisone injections. She believes she would like to proceed with surgical treatment. We reviewed postoperative rehab protocols and expectations. She would like to proceed with surgical scheduling and we will work with her in this regard. A preoperative MRI will be obtained.      Dulce Neely MD

## 2021-11-26 ENCOUNTER — TELEPHONE (OUTPATIENT)
Dept: ORTHOPEDICS | Facility: CLINIC | Age: 61
End: 2021-11-26
Payer: COMMERCIAL

## 2021-11-26 NOTE — TELEPHONE ENCOUNTER
Called and spoke to patient about scheduling surgery with Dr. Neely. Patient decided to wait to schedule until after the MRI and RTC with Dr. Neely. Patient will call when ready to schedule

## 2021-12-26 ENCOUNTER — HEALTH MAINTENANCE LETTER (OUTPATIENT)
Age: 61
End: 2021-12-26

## 2022-01-06 ENCOUNTER — VIRTUAL VISIT (OUTPATIENT)
Dept: FAMILY MEDICINE | Facility: CLINIC | Age: 62
End: 2022-01-06
Payer: COMMERCIAL

## 2022-01-06 ENCOUNTER — TELEPHONE (OUTPATIENT)
Dept: INTERNAL MEDICINE | Facility: CLINIC | Age: 62
End: 2022-01-06

## 2022-01-06 DIAGNOSIS — J06.9 UPPER RESPIRATORY TRACT INFECTION, UNSPECIFIED TYPE: Primary | ICD-10-CM

## 2022-01-06 PROCEDURE — 99213 OFFICE O/P EST LOW 20 MIN: CPT | Mod: 95 | Performed by: FAMILY MEDICINE

## 2022-01-06 NOTE — PROGRESS NOTES
DAVINA is a 61 year old who is being evaluated via a billable telephone visit.      What phone number would you like to be contacted at? [phone]  How would you like to obtain your AVS? MyChart    Assessment & Plan       ICD-10-CM    1. Upper respiratory tract infection, unspecified type  J06.9 Symptomatic; Yes; 1/5/2022 COVID-19 Virus (Coronavirus) by PCR Nasopharyngeal     Streptococcus A Rapid Scr w Reflx to PCR - Lab Collect     Influenza A/B antigen       Discussed treatment/modality options, including risk and benefits, he desires:    1) testing for COVID/Strep/Influenza    2) tylenol, mucinex DM    3) stay hydrated    4) consider UC/ER    All diagnosis above reviewed and noted above, otherwise stable.      See [facility] orders for further details.      Return in about 1 week (around 1/13/2022) for Follow Up Acute.    No LOS data to display    Doing chart review, history and exam, documentation and further activities as noted.           Justus Smith MD, FAAFP     [facility]  [address] [place], [place] [email or web address]   Office: [phone]  Fax: [phone]  Pager: [phone]       Subjective   DAVINA is a 61 year old who presents for the following health issues     HPI     Acute Illness  Acute illness concerns: not feeling good  Onset/Duration: last pm  Symptoms:  Fever: no  Chills/Sweats: YES  Headache (location?): YES  Sinus Pressure: no  Conjunctivitis:  no  Ear Pain: no  Rhinorrhea: no  Congestion: no  Sore Throat: YES - minimal   Cough: YES-non-productive  Wheeze: no  Decreased Appetite: YES  Nausea: YES  Vomiting: no  Diarrhea: no  Dysuria/Freq.: no  Dysuria or Hematuria: no  Fatigue/Achiness: YES  No change in taste or smell    Sick/Strep Exposure: no  Therapies tried and outcome: None    Review of Systems   CONSTITUTIONAL: NEGATIVE for fever, chills, change in weight  INTEGUMENTARY/SKIN: NEGATIVE for  worrisome rashes, moles or lesions  EYES: NEGATIVE for vision changes or irritation  ENT/MOUTH: NEGATIVE for ear, mouth and throat problems  RESP: NEGATIVE for significant cough or SOB  CV: NEGATIVE for chest pain, palpitations or peripheral edema  GI: NEGATIVE for nausea, abdominal pain, heartburn, or change in bowel habits  : NEGATIVE for frequency, dysuria, or hematuria  MUSCULOSKELETAL: NEGATIVE for significant arthralgias or myalgia  NEURO: NEGATIVE for weakness, dizziness or paresthesias  ENDOCRINE: NEGATIVE for temperature intolerance, skin/hair changes  HEME: NEGATIVE for bleeding problems  PSYCHIATRIC: NEGATIVE for changes in mood or affect      Objective       Vitals:  No vitals were obtained today due to virtual visit.    Physical Exam   healthy, alert and no distress  PSYCH: Alert and oriented times 3; coherent speech, normal   rate and volume, able to articulate logical thoughts, able   to abstract reason, no tangential thoughts, no hallucinations   or delusions  His affect is normal  RESP: No cough, no audible wheezing, able to talk in full sentences  Remainder of exam unable to be completed due to telephone visits    Reviewed options - offered testing    Phone call duration: 20 minutes

## 2022-01-06 NOTE — TELEPHONE ENCOUNTER
[facility] Call Center    Phone Message    May a detailed message be left on voicemail: yes     Reason for Call: Other: Patient requesting to schedule a COVID test today if possible. Patient was told to contact Maar Beard APRN CNP about this. Patient was given COVID Nurse Line PH# and was transferred to discuss/schedule.     Action Taken: Message routed to:  [facility]    Travel Screening: Not Applicable

## 2022-01-14 ENCOUNTER — ANCILLARY PROCEDURE (OUTPATIENT)
Dept: MRI IMAGING | Facility: CLINIC | Age: 62
End: 2022-01-14
Attending: ORTHOPAEDIC SURGERY
Payer: COMMERCIAL

## 2022-01-14 DIAGNOSIS — M19.011 ARTHRITIS OF SHOULDER REGION, RIGHT: ICD-10-CM

## 2022-01-14 PROCEDURE — 73221 MRI JOINT UPR EXTREM W/O DYE: CPT | Mod: RT | Performed by: RADIOLOGY

## 2022-01-20 NOTE — PROGRESS NOTES
Chief Concern: RECHECK (MRI Results)    History of Present Illness:   Alma Baldwin is a 61 year old who presents today for follow-up regarding right shoulder pain. The patient was last evaluated on 11/24/2021 for worsening pain over the last 6 months, as well as shoulder stiffness since a surgery in 1999. We discussed treatment options and patient elected to proceed with surgery at that time, with MRI prior to scheduling surgery. The patient reports having an injection administered in their shoulder previously, but it provided them no relief.     Physical Examination:  61 year old adult in no acute distress. Articulates and communicates with normal affect.  Respirations even and unlabored  Focused upper extremity exam: Forward elevation bilaterally to 160 degrees. External rotation on right to 75 degrees to 65 on the left. Internal rotation bilaterally to L2    Imaging:  MRI of the right shoulder (1959)  1. Significant glenohumeral osteoarthrosis.  2. Small joint effusion with synovitis and multiple joint bodies.  3. Rotator cuff pathology, including:  *  Infraspinatus tendinosis with moderate grade intrasubstance tearing  of the mid fibers at the footprint.  *  Supraspinatus and subscapularis tendinosis without tear.  *  Fatty atrophy of the teres minor. Otherwise, normal rotator cuff  muscle bulk.  Reading per radiology.     I have independently reviewed the above imaging studies; the results were discussed with the patient.     Assessment:   1. Right end stage glenohumeral arthrosis  2. Left early glenohumeral arthrosis  3. Status post right shoulder labral repair    Plan:   The patient comes in today for bilateral shoulder pain. I discussed the patient's current right shoulder symptoms related to arthritis. MRI findings confirm severity of arthritis but also shows that the right rotator cuff tendon is in good condition. I talked about the patient's options for treatment, which includes total shoulder  arthroplasty. We discussed postoperative expectations for range of motion and pain management. In order to help inform timing and treatment options for bilateral shoulders (given her left shoulder symptoms as well) we will obtain a left shoulder MRI with in-person follow up at the clinics and surgery center. The patient voiced understanding of all other information discussed and all questions were answered. I will see Via for follow up on 02/09/22.     Scribe Disclosure:  IGuicho, am serving as a scribe to document services personally performed by Dulce Neely MD at this visit, based upon the provider's statements to me. All documentation has been reviewed by the aforementioned provider prior to being entered into the official medical record.     Guicho GONZALES, a scribe, prepared the chart for today's encounter.

## 2022-01-24 ENCOUNTER — OFFICE VISIT (OUTPATIENT)
Dept: ORTHOPEDICS | Facility: CLINIC | Age: 62
End: 2022-01-24
Payer: COMMERCIAL

## 2022-01-24 DIAGNOSIS — M19.012 ARTHRITIS OF SHOULDER REGION, LEFT: Primary | ICD-10-CM

## 2022-01-24 PROCEDURE — 99214 OFFICE O/P EST MOD 30 MIN: CPT | Performed by: ORTHOPAEDIC SURGERY

## 2022-01-24 NOTE — PATIENT INSTRUCTIONS
Thanks for coming today.  [facility]  [address] [place] [address]    To schedule future appointments in Ortho Clinic, you may call [phone].    To schedule ordered imaging or an injection ordered by your provider:  Call [facility] Injection scheduling line: [phone]    MyChart available online at:  [email or web address]    Please call if any further questions or concerns ([phone]).  Clinic hours 8 am to 5 pm.    Return to clinic (call) if symptoms worsen or fail to improve.

## 2022-01-24 NOTE — NURSING NOTE
Reason For Visit:   Chief Complaint   Patient presents with     RECHECK     MRI Results       PCP: Mara Beard  Ref: Dr. Marie     ?  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  Work status?  Part-time.  Date of injury: chronic wear and tear  Type of injury: none.  Date of surgery: 1999 with Dr. Martin  Type of surgery: Right shoulder labral repair - labrum was floating and screws were placed.  Smoker: No  Request smoking cessation information: No     RIght hand dominant     SANE score  Affected shoulder: Right  Right shoulder SANE: 40 worse at night  Left shoulder SANE: 30 worse at night  Amelie Perea, ATC

## 2022-01-24 NOTE — LETTER
1/24/2022         RE: Prasanna Baldwin  [address]  [facility] [address]        Dear Colleague,    Thank you for referring your patient, Prasanna Baldwin, to the [facility]. Please see a copy of my visit note below.    Chief Concern: RECHECK (MRI Results)    History of Present Illness:   Alma Baldwin is a 61 year old who presents today for follow-up regarding right shoulder pain. The patient was last evaluated on 11/24/2021 for worsening pain over the last 6 months, as well as shoulder stiffness since a surgery in 1999. We discussed treatment options and patient elected to proceed with surgery at that time, with MRI prior to scheduling surgery. The patient reports having an injection administered in their shoulder previously, but it provided them no relief.     Physical Examination:  61 year old adult in no acute distress. Articulates and communicates with normal affect.  Respirations even and unlabored  Focused upper extremity exam: Forward elevation bilaterally to 160 degrees. External rotation on right to 75 degrees to 65 on the left. Internal rotation bilaterally to L2    Imaging:  MRI of the right shoulder (1959)  1. Significant glenohumeral osteoarthrosis.  2. Small joint effusion with synovitis and multiple joint bodies.  3. Rotator cuff pathology, including:  *  Infraspinatus tendinosis with moderate grade intrasubstance tearing  of the mid fibers at the footprint.  *  Supraspinatus and subscapularis tendinosis without tear.  *  Fatty atrophy of the teres minor. Otherwise, normal rotator cuff  muscle bulk.  Reading per radiology.     I have independently reviewed the above imaging studies; the results were discussed with the patient.     Assessment:   1. Right end stage glenohumeral arthrosis  2. Left early glenohumeral arthrosis  3. Status post right shoulder labral repair    Plan:   The patient comes in today for bilateral shoulder pain. I discussed the patient's  current right shoulder symptoms related to arthritis. MRI findings confirm severity of arthritis but also shows that the right rotator cuff tendon is in good condition. I talked about the patient's options for treatment, which includes total shoulder arthroplasty. We discussed postoperative expectations for range of motion and pain management. In order to help inform timing and treatment options for bilateral shoulders (given her left shoulder symptoms as well) we will obtain a left shoulder MRI with in-person follow up at the clinics and surgery center. The patient voiced understanding of all other information discussed and all questions were answered. I will see Via for follow up on 02/09/22.     Scribe Disclosure:  IGuicho, am serving as a scribe to document services personally performed by Dulce Neely MD at this visit, based upon the provider's statements to me. All documentation has been reviewed by the aforementioned provider prior to being entered into the official medical record.     Guicho GONZALES, a scribe, prepared the chart for today's encounter.      Again, thank you for allowing me to participate in the care of your patient.        Sincerely,        Dulce Neely MD

## 2022-02-28 ENCOUNTER — ANCILLARY PROCEDURE (OUTPATIENT)
Dept: MRI IMAGING | Facility: CLINIC | Age: 62
End: 2022-02-28
Attending: ORTHOPAEDIC SURGERY
Payer: COMMERCIAL

## 2022-02-28 DIAGNOSIS — M19.012 ARTHRITIS OF SHOULDER REGION, LEFT: ICD-10-CM

## 2022-02-28 PROCEDURE — 73221 MRI JOINT UPR EXTREM W/O DYE: CPT | Mod: LT | Performed by: RADIOLOGY

## 2022-03-27 NOTE — PROGRESS NOTES
Chief Concern: RECHECK (Follow up MRI)    History of Present Illness:   Alma Baldwin is a 62 year old female who presents today for follow-up regarding right shoulder pain. The patient was last seen by me on 01/24/2022 to discuss worsening pain with her right shoulder over the last 8 months. Treatment options were discussed and she had elected to proceed with surgery. On 02/28/2022 she received an MRI as directed and is here today to discuss her results and surgery scheduling. He is currently seeing Damián Burris for physical therapy at [facility]. The patient says that she has spent time lifting many boxes. She took cyclobenzaprine last night but would like to discontinue this. She also has been using lidocaine patches but not in the last two days. She states having had a past ultrasound-guided cortisone injection that provided no relief and provided even more pain. 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 The patient says that she experiences shooting pains with the left shoulder whenever reaching for objects.     Physical Examination:  Adult in no acute distress. Articulates and communicates with normal affect.  Respirations even and unlabored.  Focused upper extremity exam: Forward elevation bilaterally to 160 degrees. External rotation on right to 75 degrees to 65 on the left. Internal rotation bilaterally to L2    Imaging:  MRI of the Shoulder Left w/o Contrast (02/28/2022):  1. On the background of tendinosis, low-grade bursal sided tearing of the middle to posterior fibers of the supraspinatus tendon. No retraction. Muscle bulk intact.  1a. Low-grade articular sided tearing of the junctional fibers of the posterior supraspinatus-anterior infraspinatus at the footprint. No retraction. Muscle bulk intact.  2. Infraspinatus and subscapularis tendinosis.  3. Superior-posterior labral tear with associated para labral cyst. Blunting/fraying of the posterior and  posterior inferior labrum.  4. Moderate severe degenerative changes of the glenohumeral joint.  Reading per radiology.    Assessment:   1. Right shoulder end-stage arthritis  2. Moderate left shoulder arthritis.    Plan  I discussed the patient's current right shoulder symptoms related to arthritis. MRI findings confirm that her left rotator cuff is good while she has partial thickness tearing on the right that is not too remarkable. Given these findings and the profound arthritis in her right shoulder, I believe she is a good candidate for a right shoulder replacement surgery. I recommended that we do not manage the left side with surgical intervention at this time, as I think this would be less than ideal. We talked about the right shoulder replacement procedure extensively and she asked many questions that I answered to the best of my ability. Outside of PT and surgery, I discussed other treatment options such as acupuncture, topical medications and cortisone injection. At this time, the patient states that she has not come to a decision on whether to elect for surgery. She will pursue non-surgical options which include strengthening exercises. I wrote up a new physical therapy order, which will advise that she is not to aggressive in stretching out her right shoulder. I would like to the see the patient back as needed.     Scribe Disclosure:  I, Guicho Bautista, am serving as a scribe to document services personally performed by Dulce Neely MD at this visit, based upon the provider's statements to me. All documentation has been reviewed by the aforementioned provider prior to being entered into the official medical record.     Guicho GONZALES, a scribe, prepared the chart for today's encounter.

## 2022-03-28 ENCOUNTER — OFFICE VISIT (OUTPATIENT)
Dept: ORTHOPEDICS | Facility: CLINIC | Age: 62
End: 2022-03-28
Payer: COMMERCIAL

## 2022-03-28 VITALS — DIASTOLIC BLOOD PRESSURE: 79 MMHG | SYSTOLIC BLOOD PRESSURE: 118 MMHG

## 2022-03-28 DIAGNOSIS — M19.012 ARTHRITIS OF SHOULDER REGION, LEFT: Primary | ICD-10-CM

## 2022-03-28 DIAGNOSIS — M19.011 ARTHRITIS OF SHOULDER REGION, RIGHT: ICD-10-CM

## 2022-03-28 PROCEDURE — 99214 OFFICE O/P EST MOD 30 MIN: CPT | Performed by: ORTHOPAEDIC SURGERY

## 2022-03-28 RX ORDER — CYCLOBENZAPRINE HCL 5 MG
TABLET ORAL
COMMUNITY
Start: 2022-03-09 | End: 2022-11-14

## 2022-03-28 NOTE — NURSING NOTE
Reason For Visit:   Chief Complaint   Patient presents with     RECHECK     Follow up MRI       PCP: Mara Beard  Ref: Dr. Marie     ?  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  Work status?  Part-time.  Date of injury: chronic wear and tear  Type of injury: none.  Date of surgery: 1999 with Dr. Martin  Type of surgery: Right shoulder labral repair - labrum was floating and screws were placed.  Smoker: No  Request smoking cessation information: No     RIght hand dominant     SANE score  Affected shoulder: Left  Right shoulder SANE: 70  Left shoulder SANE: 25-30  Amelie Perea, ATC    There were no vitals taken for this visit.    Nena Suarez, ATC

## 2022-03-28 NOTE — LETTER
3/28/2022         RE: Prasanna Baldwin  [address]  [facility] [address]        Dear Colleague,    Thank you for referring your patient, Prasanna Baldwin, to the [facility]. Please see a copy of my visit note below.    Chief Concern: RECHECK (Follow up MRI)    History of Present Illness:   Alma Baldwin is a 62 year old female who presents today for follow-up regarding right shoulder pain. The patient was last seen by me on 01/24/2022 to discuss worsening pain with her right shoulder over the last 8 months. Treatment options were discussed and she had elected to proceed with surgery. On 02/28/2022 she received an MRI as directed and is here today to discuss her results and surgery scheduling. He is currently seeing Damián Burris for physical therapy at [facility]. The patient says that she has spent time lifting many boxes. She took cyclobenzaprine last night but would like to discontinue this. She also has been using lidocaine patches but not in the last two days. She states having had a past ultrasound-guided cortisone injection that provided no relief and provided even more pain. 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 The patient says that she experiences shooting pains with the left shoulder whenever reaching for objects.     Physical Examination:  Adult in no acute distress. Articulates and communicates with normal affect.  Respirations even and unlabored.  Focused upper extremity exam: Forward elevation bilaterally to 160 degrees. External rotation on right to 75 degrees to 65 on the left. Internal rotation bilaterally to L2    Imaging:  MRI of the Shoulder Left w/o Contrast (02/28/2022):  1. On the background of tendinosis, low-grade bursal sided tearing of the middle to posterior fibers of the supraspinatus tendon. No retraction. Muscle bulk intact.  1a. Low-grade articular sided tearing of the junctional fibers of the  posterior supraspinatus-anterior infraspinatus at the footprint. No retraction. Muscle bulk intact.  2. Infraspinatus and subscapularis tendinosis.  3. Superior-posterior labral tear with associated para labral cyst. Blunting/fraying of the posterior and posterior inferior labrum.  4. Moderate severe degenerative changes of the glenohumeral joint.  Reading per radiology.    Assessment:   1. Right shoulder end-stage arthritis  2. Moderate left shoulder arthritis.    Plan  I discussed the patient's current right shoulder symptoms related to arthritis. MRI findings confirm that her left rotator cuff is good while she has partial thickness tearing on the right that is not too remarkable. Given these findings and the profound arthritis in her right shoulder, I believe she is a good candidate for a right shoulder replacement surgery. I recommended that we do not manage the left side with surgical intervention at this time, as I think this would be less than ideal. We talked about the right shoulder replacement procedure extensively and she asked many questions that I answered to the best of my ability. Outside of PT and surgery, I discussed other treatment options such as acupuncture, topical medications and cortisone injection. At this time, the patient states that she has not come to a decision on whether to elect for surgery. She will pursue non-surgical options which include strengthening exercises. I wrote up a new physical therapy order, which will advise that she is not to aggressive in stretching out her right shoulder. I would like to the see the patient back as needed.     Scribe Disclosure:  Guicho GONZALES, am serving as a scribe to document services personally performed by Dulce Neely MD at this visit, based upon the provider's statements to me. All documentation has been reviewed by the aforementioned provider prior to being entered into the official medical record.     Guicho GONZALES  jamie Bautista scribrenny, prepared the chart for today's encounter.         Again, thank you for allowing me to participate in the care of your patient.        Sincerely,        Dulce Neely MD

## 2022-04-04 ENCOUNTER — THERAPY VISIT (OUTPATIENT)
Dept: PHYSICAL THERAPY | Facility: CLINIC | Age: 62
End: 2022-04-04
Payer: COMMERCIAL

## 2022-04-04 DIAGNOSIS — M25.511 CHRONIC PAIN OF BOTH SHOULDERS: ICD-10-CM

## 2022-04-04 DIAGNOSIS — M25.512 CHRONIC PAIN OF BOTH SHOULDERS: ICD-10-CM

## 2022-04-04 DIAGNOSIS — G89.29 CHRONIC PAIN OF BOTH SHOULDERS: ICD-10-CM

## 2022-04-04 PROCEDURE — 97530 THERAPEUTIC ACTIVITIES: CPT | Mod: GP | Performed by: PHYSICAL THERAPIST

## 2022-04-04 PROCEDURE — 97161 PT EVAL LOW COMPLEX 20 MIN: CPT | Mod: GP | Performed by: PHYSICAL THERAPIST

## 2022-04-04 PROCEDURE — 97110 THERAPEUTIC EXERCISES: CPT | Mod: GP | Performed by: PHYSICAL THERAPIST

## 2022-04-05 PROBLEM — M25.512 CHRONIC PAIN OF BOTH SHOULDERS: Status: ACTIVE | Noted: 2022-04-05

## 2022-04-05 PROBLEM — M25.511 CHRONIC PAIN OF BOTH SHOULDERS: Status: ACTIVE | Noted: 2022-04-05

## 2022-04-05 PROBLEM — G89.29 CHRONIC PAIN OF BOTH SHOULDERS: Status: ACTIVE | Noted: 2022-04-05

## 2022-04-05 NOTE — PROGRESS NOTES
Physical Therapy Initial Evaluation  Physical Therapy Initial Examination/Evaluation  April 4, 2022    Prasanna Baldwin  is a 62 year old  adult referred to physical therapy by Dr. Dulce Neely for treatment of B shoulder pain.      DOI/onset 2012  Mechanism of injury Patient has had ongoing B shoulder pain for years without a specific incident. 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 The L shoulder started to become painful 10 years ago.  DOS n/a  Prior treatment none despite longstanding pain.    Chief Complaint:   Limited mobility and physical use of either shoulder due to pain.   Pain location: B shoulder joints  Quality: aching and sharp  Constant/Intermittent: aching is constant. Sharp pain is intermittent with use.  Time of day: no specific time pattern  Symptoms have worsened since onset.    Current pain 5/10.  Pain at best 3/10.  Pain at worst 7/10.    Symptoms aggravated by reaching overhead and behind back. Lifting/carrying > 10 pounds.    Symptoms improved with rest.     Social history:  . 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  Job duties:  Repetitive UE use/tasks.    Patient having difficulty with ADLs: carrying groceries, laundry.    Patient's goals are to reduce pain and return to dentistry.    Patient reports general health as excellent.  Related medical history long hx of shoulder problems R > L.    Surgical History:  R shoulder arthroscopic surgery in 1999 that did not go well.    Imaging: MRI B shoulders show severe OA.    Medications:  none.       Return to MD:  Nothing scheduled.      Clinical Impression: Patient will hopefully find benefit from continued PT intervention working on manual therapy and therapeutic exercise to try and improve shoulder strength and mobility.    Subjective:  HPI                    Objective:  Standing Alignment:      Shoulder/UE:  Humeral head  anterior R and protracted scapula R                  Flexibility/Screens:   Positive screens:  Shoulder  Upper Extremity:    Decreased left upper extremity flexibility at:  Pectoralis Major    Decreased right upper extremity flexibility present at:  Pectoralis Major                           Shoulder Evaluation:  ROM:  AROM:    Flexion:  Left:  105    Right:  112    Abduction:  Left: 110   Right:  137    Internal Rotation:  Left:  52    Right:  54  External Rotation:  Left:  45    Right:  38                      Strength:    Flexion: Left:4-/5    Pain: +    Right: 4/5     Pain:     Abduction:  Left: 4-/5   Pain:+    Right: 4-/5      Pain:+    Internal Rotation:  Left:4/5     Pain:    Right: 4/5     Pain:  External Rotation:   Left:4-/5      Pain:+   Right:4-/5      Pain:+            Stability Testing:  normal      Special Tests:  Special tests assessed shoulder: (+) [place]'s B; (+) [place]'s L; (+) empty can B.  Left shoulder positive for the following special tests:  Labral and Impingement  Right shoulder positive for the following special tests:Labral  Palpation:  Palpation assessed shoulder: tender at B ant GH joint.  Left shoulder tenderness present at:  Infraspinatus and Teres Minor  Right shoulder tenderness present at: Infraspinatus and Teres Minor  Mobility Tests:    Glenohumeral anterior right:  Hypomobile  Glenohumeral posterior right:  Hypomobile  Glenohumeral inferior right:  Hypomobile                                             General     ROS    Assessment/Plan:    Patient is a 62 year old adult with both sides shoulder complaints.    Patient has the following significant findings with corresponding treatment plan.                Diagnosis 1:  B shoulder pain  Pain -  manual therapy, self management and education  Decreased ROM/flexibility - manual therapy and therapeutic exercise  Decreased strength - therapeutic exercise and therapeutic activities  Impaired muscle performance - neuro  re-education  Decreased function - therapeutic activities    Therapy Evaluation Codes:   1) History comprised of:   Personal factors that impact the plan of care:      Time since onset of symptoms.    Comorbidity factors that impact the plan of care are:      Osteoarthritis.     Medications impacting care: None.  2) Examination of Body Systems comprised of:   Body structures and functions that impact the plan of care:      Shoulder.   Activity limitations that impact the plan of care are:      Lifting and Working.  3) Clinical presentation characteristics are:   Stable/Uncomplicated.  4) Decision-Making    Low complexity using standardized patient assessment instrument and/or measureable assessment of functional outcome.  Cumulative Therapy Evaluation is: Low complexity.    Previous and current functional limitations:  (See Goal Flow Sheet for this information)    Short term and Long term goals: (See Goal Flow Sheet for this information)     Communication ability:  Patient appears to be able to clearly communicate and understand verbal and written communication and follow directions correctly.  Treatment Explanation - The following has been discussed with the patient:   RX ordered/plan of care  Anticipated outcomes  Possible risks and side effects  This patient would benefit from PT intervention to resume normal activities.   Rehab potential is fair.    Frequency:  1 X week, once daily  Duration:  for 3 weeks tapering to 2 X a month over   6 weeks  Discharge Plan:  Achieve all LTG.  Independent in home treatment program.  Reach maximal therapeutic benefit.    Please refer to the daily flowsheet for treatment today, total treatment time and time spent performing 1:1 timed codes.

## 2022-04-05 NOTE — PROGRESS NOTES
Physical Therapy Initial Evaluation  Subjective:    Patient Health History             Pertinent medical history includes: depression.     Medical allergies: NKDA.       Current medications:  Anti-depressants (Estrodial, progesterone, speroesloctone).    Current occupation is Dentist.                                       Objective:  System    Physical Exam    General     ROS    Assessment/Plan:

## 2022-04-07 NOTE — PROGRESS NOTES
[facility]    OUTPATIENT Physical Therapy ORTHOPEDIC EVALUATION  PLAN OF TREATMENT FOR OUTPATIENT REHABILITATION  (COMPLETE FOR INITIAL CLAIMS ONLY)  Patient's Last Name, First Name, M.I.  YOB: 1960  Prasanna Baldwin    Provider s Name:  [facility]   Medical Record No.  [id number]   Start of Care Date:  04/04/22   Onset Date:    (2012)   Type:     _X__PT   ___OT Medical Diagnosis:    Encounter Diagnosis   Name Primary?    Chronic pain of both shoulders         Treatment Diagnosis:  B shoulder pain        Goals:     04/04/22 0500   Body Part   Goals listed below are for B shoulder   Goal #1   Goal #1 reaching   Previous Functional Level No restrictions   Current Functional Level Can reach ;behind back;overhead   Performance level with pain 7/10   STG Target Performance Reach ;behind back;overhead   Performance level with pain 5/10   Rationale for independent personal hygiene;for dressing;for bathing   Due date 05/02/22   LTG Target Performance Reach;behind back;overhead   Performance Level with pain 2/10   Rationale for independent personal hygiene;for dressing;for bathing   Due date 05/30/22       Therapy Frequency:  1 x week  Predicted Duration of Therapy Intervention:  8 weeks    Marce Mistry, PT                 I CERTIFY THE NEED FOR THESE SERVICES FURNISHED UNDER        THIS PLAN OF TREATMENT AND WHILE UNDER MY CARE     (Physician attestation of this document indicates review and certification of the therapy plan).                       Certification Date From:  04/04/22   Certification Date To:  07/02/22    Referring Provider:  Dulce Neely    Initial Assessment        See Epic Evaluation SOC Date: 04/04/22

## 2022-04-12 ENCOUNTER — THERAPY VISIT (OUTPATIENT)
Dept: PHYSICAL THERAPY | Facility: CLINIC | Age: 62
End: 2022-04-12
Payer: COMMERCIAL

## 2022-04-12 DIAGNOSIS — M25.512 CHRONIC PAIN OF BOTH SHOULDERS: Primary | ICD-10-CM

## 2022-04-12 DIAGNOSIS — M25.511 CHRONIC PAIN OF BOTH SHOULDERS: Primary | ICD-10-CM

## 2022-04-12 DIAGNOSIS — G89.29 CHRONIC PAIN OF BOTH SHOULDERS: Primary | ICD-10-CM

## 2022-04-12 PROCEDURE — 97035 APP MDLTY 1+ULTRASOUND EA 15: CPT | Mod: GP | Performed by: PHYSICAL THERAPIST

## 2022-04-12 PROCEDURE — 97110 THERAPEUTIC EXERCISES: CPT | Mod: GP | Performed by: PHYSICAL THERAPIST

## 2022-04-12 PROCEDURE — 97530 THERAPEUTIC ACTIVITIES: CPT | Mod: GP | Performed by: PHYSICAL THERAPIST

## 2022-04-19 ENCOUNTER — THERAPY VISIT (OUTPATIENT)
Dept: PHYSICAL THERAPY | Facility: CLINIC | Age: 62
End: 2022-04-19
Payer: COMMERCIAL

## 2022-04-19 DIAGNOSIS — M25.512 CHRONIC PAIN OF BOTH SHOULDERS: Primary | ICD-10-CM

## 2022-04-19 DIAGNOSIS — G89.29 CHRONIC PAIN OF BOTH SHOULDERS: Primary | ICD-10-CM

## 2022-04-19 DIAGNOSIS — M25.511 CHRONIC PAIN OF BOTH SHOULDERS: Primary | ICD-10-CM

## 2022-04-19 PROCEDURE — 97110 THERAPEUTIC EXERCISES: CPT | Mod: GP | Performed by: PHYSICAL THERAPIST

## 2022-04-19 PROCEDURE — 97035 APP MDLTY 1+ULTRASOUND EA 15: CPT | Mod: GP | Performed by: PHYSICAL THERAPIST

## 2022-04-19 PROCEDURE — 97530 THERAPEUTIC ACTIVITIES: CPT | Mod: GP | Performed by: PHYSICAL THERAPIST

## 2022-04-19 NOTE — PROGRESS NOTES
Subjective:  HPI  Physical Exam                    Objective:  System    Physical Exam    General     ROS    Assessment/Plan:    SUBJECTIVE  Subjective changes as noted by pt:   Pt. cont to note slow gradual progress with his shoulders. They have been more sore in recent days.   Current pain level:  6/10   Changes in function:  Yes (See Goal flowsheet attached for changes in current functional level)     Adverse reaction to treatment or activity:  None    OBJECTIVE  Changes in objective findings:  AROM R sh flex 130; abd 105; IR 60; ER 35. L sh flex 112; abd 123; IR 70; ER 55.     ASSESSMENT  Prasanna continues to require intervention to meet STG and LTG's: PT  Patient is progressing as expected.  Response to therapy has shown an improvement in  pain level and ROM   Progress made towards STG/LTG?  Yes (See Goal flowsheet attached for updates on achievement of STG and LTG)    PLAN  Current treatment program is being advanced to more complex exercises.    PTA/ATC plan:  N/A    Please refer to the daily flowsheet for treatment today, total treatment time and time spent performing 1:1 timed codes.

## 2022-04-26 ENCOUNTER — THERAPY VISIT (OUTPATIENT)
Dept: PHYSICAL THERAPY | Facility: CLINIC | Age: 62
End: 2022-04-26
Payer: COMMERCIAL

## 2022-04-26 DIAGNOSIS — M25.511 CHRONIC PAIN OF BOTH SHOULDERS: Primary | ICD-10-CM

## 2022-04-26 DIAGNOSIS — M25.512 CHRONIC PAIN OF BOTH SHOULDERS: Primary | ICD-10-CM

## 2022-04-26 DIAGNOSIS — G89.29 CHRONIC PAIN OF BOTH SHOULDERS: Primary | ICD-10-CM

## 2022-04-26 PROCEDURE — 97140 MANUAL THERAPY 1/> REGIONS: CPT | Mod: GP | Performed by: PHYSICAL THERAPIST

## 2022-04-26 PROCEDURE — 97035 APP MDLTY 1+ULTRASOUND EA 15: CPT | Mod: GP | Performed by: PHYSICAL THERAPIST

## 2022-04-26 PROCEDURE — 97110 THERAPEUTIC EXERCISES: CPT | Mod: GP | Performed by: PHYSICAL THERAPIST

## 2022-05-06 ENCOUNTER — THERAPY VISIT (OUTPATIENT)
Dept: PHYSICAL THERAPY | Facility: CLINIC | Age: 62
End: 2022-05-06
Payer: COMMERCIAL

## 2022-05-06 DIAGNOSIS — G89.29 CHRONIC PAIN OF BOTH SHOULDERS: Primary | ICD-10-CM

## 2022-05-06 DIAGNOSIS — M25.511 CHRONIC PAIN OF BOTH SHOULDERS: Primary | ICD-10-CM

## 2022-05-06 DIAGNOSIS — M25.512 CHRONIC PAIN OF BOTH SHOULDERS: Primary | ICD-10-CM

## 2022-05-06 PROCEDURE — 97140 MANUAL THERAPY 1/> REGIONS: CPT | Mod: GP | Performed by: PHYSICAL THERAPIST

## 2022-05-06 PROCEDURE — 97035 APP MDLTY 1+ULTRASOUND EA 15: CPT | Mod: GP | Performed by: PHYSICAL THERAPIST

## 2022-05-06 PROCEDURE — 97110 THERAPEUTIC EXERCISES: CPT | Mod: GP | Performed by: PHYSICAL THERAPIST

## 2022-05-06 NOTE — PROGRESS NOTES
Subjective:  HPI  Physical Exam                    Objective:  System    Physical Exam    General     ROS    Assessment/Plan:    SUBJECTIVE  Subjective changes as noted by pt:   Pt. reports slow gradual improvement with B sh ROM and pain.   Current pain level:  5/10   Changes in function:  Yes (See Goal flowsheet attached for changes in current functional level)     Adverse reaction to treatment or activity:  None    OBJECTIVE  Changes in objective findings: AROM L sh flex 124; abd 110; IR 75; ER 52. R sh flex 115; abd 111; IR 55; ER 35.     ASSESSMENT  Prasanna continues to require intervention to meet STG and LTG's: PT  Patient is progressing as expected.  Response to therapy has shown an improvement in  pain level and ROM   Progress made towards STG/LTG?  Yes (See Goal flowsheet attached for updates on achievement of STG and LTG)    PLAN  Current treatment program is being advanced to more complex exercises.    PTA/ATC plan:  N/A    Please refer to the daily flowsheet for treatment today, total treatment time and time spent performing 1:1 timed codes.

## 2022-05-23 ENCOUNTER — THERAPY VISIT (OUTPATIENT)
Dept: PHYSICAL THERAPY | Facility: CLINIC | Age: 62
End: 2022-05-23
Payer: COMMERCIAL

## 2022-05-23 DIAGNOSIS — M25.512 CHRONIC PAIN OF BOTH SHOULDERS: Primary | ICD-10-CM

## 2022-05-23 DIAGNOSIS — G89.29 CHRONIC PAIN OF BOTH SHOULDERS: Primary | ICD-10-CM

## 2022-05-23 DIAGNOSIS — M25.511 CHRONIC PAIN OF BOTH SHOULDERS: Primary | ICD-10-CM

## 2022-05-23 PROCEDURE — 97140 MANUAL THERAPY 1/> REGIONS: CPT | Mod: GP | Performed by: PHYSICAL THERAPIST

## 2022-05-23 PROCEDURE — 97110 THERAPEUTIC EXERCISES: CPT | Mod: GP | Performed by: PHYSICAL THERAPIST

## 2022-05-23 PROCEDURE — 97035 APP MDLTY 1+ULTRASOUND EA 15: CPT | Mod: GP | Performed by: PHYSICAL THERAPIST

## 2022-05-23 NOTE — PROGRESS NOTES
Subjective:  HPI  Physical Exam                    Objective:  System    Physical Exam    General     ROS    Assessment/Plan:    SUBJECTIVE  Subjective changes as noted by pt:   Pt. had to miss the past 2 PT appts but cont to gain ROM despite this.   Current pain level: 5/10   Changes in function:  Yes (See Goal flowsheet attached for changes in current functional level)     Adverse reaction to treatment or activity:  None    OBJECTIVE  Changes in objective findings:  AROM L sh flex 130; abd 119; IR 79; ER 51. R sh flex 118; abd 110; IR 65; ER 35.     ASSESSMENT  Prasanna continues to require intervention to meet STG and LTG's: PT  Patient is progressing as expected.  Response to therapy has shown an improvement in  pain level and ROM   Progress made towards STG/LTG?  Yes (See Goal flowsheet attached for updates on achievement of STG and LTG)    PLAN  Current treatment program is being advanced to more complex exercises.    PTA/ATC plan:  N/A    Please refer to the daily flowsheet for treatment today, total treatment time and time spent performing 1:1 timed codes.

## 2022-06-03 ENCOUNTER — THERAPY VISIT (OUTPATIENT)
Dept: PHYSICAL THERAPY | Facility: CLINIC | Age: 62
End: 2022-06-03
Payer: COMMERCIAL

## 2022-06-03 DIAGNOSIS — G89.29 CHRONIC PAIN OF BOTH SHOULDERS: Primary | ICD-10-CM

## 2022-06-03 DIAGNOSIS — M25.511 CHRONIC PAIN OF BOTH SHOULDERS: Primary | ICD-10-CM

## 2022-06-03 DIAGNOSIS — M25.512 CHRONIC PAIN OF BOTH SHOULDERS: Primary | ICD-10-CM

## 2022-06-03 PROCEDURE — 97110 THERAPEUTIC EXERCISES: CPT | Mod: GP | Performed by: PHYSICAL THERAPIST

## 2022-06-03 PROCEDURE — 97140 MANUAL THERAPY 1/> REGIONS: CPT | Mod: GP | Performed by: PHYSICAL THERAPIST

## 2022-06-03 PROCEDURE — 97035 APP MDLTY 1+ULTRASOUND EA 15: CPT | Mod: GP | Performed by: PHYSICAL THERAPIST

## 2022-06-03 NOTE — PROGRESS NOTES
Subjective:  HPI  Physical Exam                    Objective:  System    Physical Exam    General     ROS    Assessment/Plan:    PROGRESS  REPORT    Progress reporting period is from 4-4-22 to 6-3-22.       SUBJECTIVE  Subjective changes noted by patient:   Pt. is making good progress in PT with decreasing overall B shoulder pain and increasing ROM and strength. Patient has been able to use the arms for daily activities more functionally and without difficulty.    Current pain level is  4/10.     Previous pain level was  7/10.   Changes in function:  Yes (See Goal flowsheet attached for changes in current functional level)  Adverse reaction to treatment or activity: None    OBJECTIVE  Changes noted in objective findings: AROM L sh flex 133; abd 125; IR 80; ER 56. R sh flex 132; abd 123; IR 64; ER 35.     ASSESSMENT/PLAN  Updated problem list and treatment plan: Diagnosis 1:  B shoulder pain  Pain -  US, manual therapy, self management and education  Decreased ROM/flexibility - manual therapy and therapeutic exercise  Decreased strength - therapeutic exercise and therapeutic activities  Impaired muscle performance - neuro re-education  Decreased function - therapeutic activities  STG/LTGs have been met or progress has been made towards goals:  Yes (See Goal flow sheet completed today.)  Assessment of Progress: The patient's condition is improving.  Self Management Plans:  Patient has been instructed in a home treatment program.  Patient  has been instructed in self management of symptoms.  I have re-evaluated this patient and find that the nature, scope, duration and intensity of the therapy is appropriate for the medical condition of the patient.  Prasanna continues to require the following intervention to meet STG and LTG's:  PT    Recommendations:  This patient would benefit from continued therapy.     Frequency:  1 X week, once daily  Duration:  for 8 weeks        Please refer to the daily flowsheet for treatment  today, total treatment time and time spent performing 1:1 timed codes.

## 2022-06-07 ENCOUNTER — THERAPY VISIT (OUTPATIENT)
Dept: PHYSICAL THERAPY | Facility: CLINIC | Age: 62
End: 2022-06-07
Payer: COMMERCIAL

## 2022-06-07 DIAGNOSIS — M25.512 CHRONIC PAIN OF BOTH SHOULDERS: Primary | ICD-10-CM

## 2022-06-07 DIAGNOSIS — M25.511 CHRONIC PAIN OF BOTH SHOULDERS: Primary | ICD-10-CM

## 2022-06-07 DIAGNOSIS — G89.29 CHRONIC PAIN OF BOTH SHOULDERS: Primary | ICD-10-CM

## 2022-06-07 PROCEDURE — 97035 APP MDLTY 1+ULTRASOUND EA 15: CPT | Mod: GP | Performed by: PHYSICAL THERAPIST

## 2022-06-07 PROCEDURE — 97140 MANUAL THERAPY 1/> REGIONS: CPT | Mod: GP | Performed by: PHYSICAL THERAPIST

## 2022-06-07 PROCEDURE — 97110 THERAPEUTIC EXERCISES: CPT | Mod: GP | Performed by: PHYSICAL THERAPIST

## 2022-06-20 ENCOUNTER — THERAPY VISIT (OUTPATIENT)
Dept: PHYSICAL THERAPY | Facility: CLINIC | Age: 62
End: 2022-06-20
Payer: COMMERCIAL

## 2022-06-20 DIAGNOSIS — M25.511 CHRONIC PAIN OF BOTH SHOULDERS: Primary | ICD-10-CM

## 2022-06-20 DIAGNOSIS — G89.29 CHRONIC PAIN OF BOTH SHOULDERS: Primary | ICD-10-CM

## 2022-06-20 DIAGNOSIS — M25.512 CHRONIC PAIN OF BOTH SHOULDERS: Primary | ICD-10-CM

## 2022-06-20 PROCEDURE — 97110 THERAPEUTIC EXERCISES: CPT | Mod: GP | Performed by: PHYSICAL THERAPIST

## 2022-06-20 PROCEDURE — 97035 APP MDLTY 1+ULTRASOUND EA 15: CPT | Mod: GP | Performed by: PHYSICAL THERAPIST

## 2022-06-20 PROCEDURE — 97140 MANUAL THERAPY 1/> REGIONS: CPT | Mod: GP | Performed by: PHYSICAL THERAPIST

## 2022-06-20 NOTE — PROGRESS NOTES
Subjective:  HPI  Physical Exam                    Objective:  System    Physical Exam    General     ROS    Assessment/Plan:    SUBJECTIVE  Subjective changes as noted by pt:   Pt. missed last week's visit but overall feels she is making good progress with decreasing shoulder pain and increasing ROM.   Current pain level: 3/10   Changes in function:  Yes (See Goal flowsheet attached for changes in current functional level)     Adverse reaction to treatment or activity:  None    OBJECTIVE  Changes in objective findings:  AROM L sh flex 139; abd 120; IR 75; ER 59. R sh flex 136; abd 123; IR 69; ER 45.     ASSESSMENT  Prasanna continues to require intervention to meet STG and LTG's: PT  Patient's symptoms are resolving.  Response to therapy has shown an improvement in  pain level and ROM   Progress made towards STG/LTG?  Yes (See Goal flowsheet attached for updates on achievement of STG and LTG)    PLAN  Current treatment program is being advanced to more complex exercises.    PTA/ATC plan:  N/A    Please refer to the daily flowsheet for treatment today, total treatment time and time spent performing 1:1 timed codes.

## 2022-07-11 ENCOUNTER — THERAPY VISIT (OUTPATIENT)
Dept: PHYSICAL THERAPY | Facility: CLINIC | Age: 62
End: 2022-07-11
Payer: COMMERCIAL

## 2022-07-11 DIAGNOSIS — G89.29 CHRONIC PAIN OF BOTH SHOULDERS: Primary | ICD-10-CM

## 2022-07-11 DIAGNOSIS — M25.511 CHRONIC PAIN OF BOTH SHOULDERS: Primary | ICD-10-CM

## 2022-07-11 DIAGNOSIS — M25.512 CHRONIC PAIN OF BOTH SHOULDERS: Primary | ICD-10-CM

## 2022-07-11 PROCEDURE — 97140 MANUAL THERAPY 1/> REGIONS: CPT | Mod: GP | Performed by: PHYSICAL THERAPIST

## 2022-07-11 NOTE — PROGRESS NOTES
Subjective:  HPI  Physical Exam                    Objective:  System    Physical Exam    General     ROS    Assessment/Plan:    SUBJECTIVE  Subjective changes as noted by pt:   Pt. is 20 min late today missing half his appt time. Pt. reports mod improvement in ROM and sh pain the past few weeks.   Current pain level:  3/10   Changes in function:  Yes (See Goal flowsheet attached for changes in current functional level)     Adverse reaction to treatment or activity:  None    OBJECTIVE  Changes in objective findings: AROM L sh flex 148; abd 120; IR 75; ER 61. AROM R sh flex 136; abd 116; IR 59; ER 45.     ASSESSMENT  Prasanna continues to require intervention to meet STG and LTG's: PT  Patient's symptoms are resolving.  Response to therapy has shown an improvement in  pain level and ROM   Progress made towards STG/LTG?  Yes (See Goal flowsheet attached for updates on achievement of STG and LTG)    PLAN  Current treatment program is being advanced to more complex exercises.    PTA/ATC plan:  N/A    Please refer to the daily flowsheet for treatment today, total treatment time and time spent performing 1:1 timed codes.

## 2022-07-25 ENCOUNTER — THERAPY VISIT (OUTPATIENT)
Dept: PHYSICAL THERAPY | Facility: CLINIC | Age: 62
End: 2022-07-25
Payer: COMMERCIAL

## 2022-07-25 DIAGNOSIS — M25.512 CHRONIC PAIN OF BOTH SHOULDERS: Primary | ICD-10-CM

## 2022-07-25 DIAGNOSIS — G89.29 CHRONIC PAIN OF BOTH SHOULDERS: Primary | ICD-10-CM

## 2022-07-25 DIAGNOSIS — M25.511 CHRONIC PAIN OF BOTH SHOULDERS: Primary | ICD-10-CM

## 2022-07-25 PROCEDURE — 97110 THERAPEUTIC EXERCISES: CPT | Mod: GP | Performed by: PHYSICAL THERAPIST

## 2022-07-25 PROCEDURE — 97140 MANUAL THERAPY 1/> REGIONS: CPT | Mod: GP | Performed by: PHYSICAL THERAPIST

## 2022-07-25 PROCEDURE — 97035 APP MDLTY 1+ULTRASOUND EA 15: CPT | Mod: GP | Performed by: PHYSICAL THERAPIST

## 2022-07-25 NOTE — PROGRESS NOTES
Subjective:  HPI  Physical Exam                    Objective:  System    Physical Exam    General     ROS    Assessment/Plan:    SUBJECTIVE  Subjective changes as noted by pt:   Pt. reports a little more shoulder soreness the past week along with a little more tightness.   Current pain level:  4/10   Changes in function:  Yes (See Goal flowsheet attached for changes in current functional level)     Adverse reaction to treatment or activity:  None    OBJECTIVE  Changes in objective findings: AROM L sh flex 145; abd 108; IR 75; ER 56. R sh flex 130; abd 115; IR 49; ER 29.     ASSESSMENT  Prasanna continues to require intervention to meet STG and LTG's: PT  Patient is progressing as expected.  Response to therapy has shown an improvement in  pain level and ROM   Progress made towards STG/LTG?  Yes (See Goal flowsheet attached for updates on achievement of STG and LTG)    PLAN  Current treatment program is being advanced to more complex exercises.    PTA/ATC plan:  N/A    Please refer to the daily flowsheet for treatment today, total treatment time and time spent performing 1:1 timed codes.

## 2022-08-09 ENCOUNTER — OFFICE VISIT (OUTPATIENT)
Dept: INTERNAL MEDICINE | Facility: CLINIC | Age: 62
End: 2022-08-09
Payer: COMMERCIAL

## 2022-08-09 ENCOUNTER — LAB (OUTPATIENT)
Dept: LAB | Facility: CLINIC | Age: 62
End: 2022-08-09
Payer: COMMERCIAL

## 2022-08-09 VITALS
SYSTOLIC BLOOD PRESSURE: 124 MMHG | HEIGHT: 75 IN | HEART RATE: 84 BPM | OXYGEN SATURATION: 97 % | DIASTOLIC BLOOD PRESSURE: 82 MMHG | WEIGHT: 186.6 LBS | BODY MASS INDEX: 23.2 KG/M2

## 2022-08-09 DIAGNOSIS — Z13.1 SCREENING FOR DIABETES MELLITUS: ICD-10-CM

## 2022-08-09 DIAGNOSIS — F64.9 GENDER IDENTITY DISORDER: ICD-10-CM

## 2022-08-09 DIAGNOSIS — Z13.0 SCREENING FOR DEFICIENCY ANEMIA: ICD-10-CM

## 2022-08-09 DIAGNOSIS — Z12.5 SCREENING FOR PROSTATE CANCER: ICD-10-CM

## 2022-08-09 DIAGNOSIS — Z13.29 SCREENING FOR THYROID DISORDER: ICD-10-CM

## 2022-08-09 DIAGNOSIS — Z12.11 SCREENING FOR COLON CANCER: ICD-10-CM

## 2022-08-09 DIAGNOSIS — Z12.31 ENCOUNTER FOR SCREENING MAMMOGRAM FOR BREAST CANCER: Primary | ICD-10-CM

## 2022-08-09 LAB
ANION GAP SERPL CALCULATED.3IONS-SCNC: 6 MMOL/L (ref 3–14)
BUN SERPL-MCNC: 16 MG/DL (ref 7–30)
CALCIUM SERPL-MCNC: 8.8 MG/DL (ref 8.5–10.1)
CHLORIDE BLD-SCNC: 103 MMOL/L (ref 94–109)
CO2 SERPL-SCNC: 29 MMOL/L (ref 20–32)
CREAT SERPL-MCNC: 0.97 MG/DL (ref 0.52–1.25)
ERYTHROCYTE [DISTWIDTH] IN BLOOD BY AUTOMATED COUNT: 12.5 % (ref 10–15)
GFR SERPL CREATININE-BSD FRML MDRD: 88 ML/MIN/1.73M2
GLUCOSE BLD-MCNC: 109 MG/DL (ref 70–99)
HBA1C MFR BLD: 5.5 % (ref 0–5.6)
HCT VFR BLD AUTO: 43.7 % (ref 35–53)
HGB BLD-MCNC: 14.6 G/DL (ref 11.7–17.7)
MCH RBC QN AUTO: 30.8 PG (ref 26.5–33)
MCHC RBC AUTO-ENTMCNC: 33.4 G/DL (ref 31.5–36.5)
MCV RBC AUTO: 92 FL (ref 78–100)
PLATELET # BLD AUTO: 224 10E3/UL (ref 150–450)
POTASSIUM BLD-SCNC: 3.8 MMOL/L (ref 3.4–5.3)
PSA SERPL-MCNC: 0.38 UG/L (ref 0–4)
RBC # BLD AUTO: 4.74 10E6/UL (ref 3.8–5.9)
SODIUM SERPL-SCNC: 138 MMOL/L (ref 133–144)
TSH SERPL DL<=0.005 MIU/L-ACNC: 0.85 MU/L (ref 0.4–4)
WBC # BLD AUTO: 8.3 10E3/UL (ref 4–11)

## 2022-08-09 PROCEDURE — 85027 COMPLETE CBC AUTOMATED: CPT | Performed by: PATHOLOGY

## 2022-08-09 PROCEDURE — 99396 PREV VISIT EST AGE 40-64: CPT | Mod: 25 | Performed by: NURSE PRACTITIONER

## 2022-08-09 PROCEDURE — 80048 BASIC METABOLIC PNL TOTAL CA: CPT | Performed by: PATHOLOGY

## 2022-08-09 PROCEDURE — 90715 TDAP VACCINE 7 YRS/> IM: CPT | Performed by: NURSE PRACTITIONER

## 2022-08-09 PROCEDURE — 83036 HEMOGLOBIN GLYCOSYLATED A1C: CPT | Performed by: PATHOLOGY

## 2022-08-09 PROCEDURE — 84443 ASSAY THYROID STIM HORMONE: CPT | Performed by: PATHOLOGY

## 2022-08-09 PROCEDURE — G0103 PSA SCREENING: HCPCS | Performed by: PATHOLOGY

## 2022-08-09 PROCEDURE — 36415 COLL VENOUS BLD VENIPUNCTURE: CPT | Performed by: PATHOLOGY

## 2022-08-09 PROCEDURE — 90471 IMMUNIZATION ADMIN: CPT | Performed by: NURSE PRACTITIONER

## 2022-08-09 RX ORDER — SPIRONOLACTONE 50 MG/1
100 TABLET, FILM COATED ORAL DAILY
Qty: 30 TABLET | Refills: 0 | Status: SHIPPED | OUTPATIENT
Start: 2022-08-09

## 2022-08-09 RX ORDER — ESTRADIOL 2 MG/1
8 TABLET ORAL DAILY
Refills: 0
Start: 2022-08-09

## 2022-08-09 NOTE — PROGRESS NOTES
S: Prasanna Baldwin is a 62 year old adult here for annual follow-up.  SHe is doing well.  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    Last colonoscopy was .    See medication list,    She is living with a partner and they are having a commitment ceremony 22.         She has not had contact with her two sons for 2 years.     Patient Active Problem List   Diagnosis     Scapulothoracic syndrome     Gastroesophageal reflux disease without esophagitis     Klinefelter's syndrome diagnosed 1980     Gender dysphoria     Adjustment disorder with depressed mood     Chronic pain of both shoulders            Past Medical History:   Diagnosis Date     Adjustment disorder with depressed mood 2020     Alcohol abuse     Hx     Depression      Depression      Gender dysphoria      GERD (gastroesophageal reflux disease)      Klinefelter syndrome      Male-to-female transgender person 12/3/2018     Right rib fracture     9th rib, non-displaced.      Scapulothoracic syndrome             Past Surgical History:   Procedure Laterality Date     ARTHROSCOPIC RECONSTRUCTION ANTERIOR CRUCIATE LIGAMENT  2001     ARTHROSCOPIC REPAIR ACL       CHOLECYSTECTOMY  2006     CHOLECYSTECTOMY              Social History     Tobacco Use     Smoking status: Former Smoker     Types: Pipe, Cigarettes     Quit date: 2021     Years since quittin.0     Smokeless tobacco: Never Used     Tobacco comment: 1 pipe/day   Substance Use Topics     Alcohol use: Not Currently     Comment: occ beer            Family History   Problem Relation Age of Onset     Rheumatoid Arthritis Mother      Diabetes Type 2  Father      Dementia Father      Abdominal Aortic Aneurysm Father      Pancreatic Cancer Father      Prostate Cancer Father      Cancer Paternal Uncle         testicular               Allergies   Allergen Reactions     Nkda [No Known Drug Allergies]            "  Current Outpatient Medications   Medication Sig Dispense Refill     buPROPion (WELLBUTRIN XL) 150 MG 24 hr tablet Take 150 mg by mouth every morning With 300mg for total of 450mg       buPROPion (WELLBUTRIN XL) 300 MG 24 hr tablet Take 300 mg by mouth every morning With 150mg for total of 450mg       cyclobenzaprine (FLEXERIL) 5 MG tablet        estradiol (ESTRACE) 2 MG tablet Take 4 mg by mouth daily   0     Multiple Vitamins-Minerals (EMERGEN-C IMMUNE PO) Take 1 tablet by mouth daily       progesterone (PROMETRIUM) 100 MG capsule Take 200 mg by mouth daily   1     spironolactone (ALDACTONE) 50 MG tablet Take 100 mg by mouth daily TWO TABLETS IN PM       traZODone (DESYREL) 100 MG tablet Take 1 tablet (100 mg) by mouth nightly as needed for sleep 90 tablet 0       REVIEW OF SYSTEMS:  See above.    O:   /82 (BP Location: Right arm, Patient Position: Sitting, Cuff Size: Adult Regular)   Pulse 84   Ht 1.895 m (6' 2.61\")   Wt 84.6 kg (186 lb 9.6 oz)   SpO2 97%   BMI 23.57 kg/m    GENERAL APPEARANCE: healthy, alert and no distress  EYES:Grossly normal  HENT: ear canals and TM's normal and nose and mouth without ulcers or lesions  RESP: lungs clear to auscultation - no rales, rhonchi or wheezes  CV: regular rates and rhythm, normal S1 S2, no S3 or S4 and no murmur, click or rub   ABDOMEN:  soft, nontender, no HSM or masses and bowel sounds normal  NEURO: normal.  MUSK: normal.  SKIN: normal.Both nipples are pierced as well an infra-umbilical piercing.   LYMPH:neg axillary, cervical, supra and infraclavicular nodes.  EXT: warm.  Edema: none  PSYCHE: She feels she is doing well.  BREASTS: No masses, bilateral nipple piercing.     A/P:  Prasanna was seen today for physical.    Diagnoses and all orders for this visit:    Encounter for screening mammogram for breast cancer  -     Mammogram, routine screening; Future    Gender identity disorder  -     estradiol (ESTRACE) 2 MG tablet; Take 4 tablets (8 mg) by mouth " daily  -     spironolactone (ALDACTONE) 50 MG tablet; Take 2 tablets (100 mg) by mouth daily    Screening for colon cancer  -     COLOGUARD([employer])    Screening for deficiency anemia  -     CBC with platelets; Future    Screening for diabetes mellitus  -     Basic metabolic panel; Future  -     Hemoglobin A1c; Future    Screening for thyroid disorder  -     TSH; Future    Screening for prostate cancer  -     PSA screen; Future    Other orders  -     TDAP VACCINE (Adacel, Boostrix)  [[id number]]      The patient voiced understanding of the information discussed and all questions were answered.     I spent a total of  45 minutes in the care of this pt during today's office visit. This time includes reviewing the patient's chart and prior history, obtaining a history, performing an examination and evaluation and counseling the patient. This time also includes ordering medications or tests necessary in addition to communication to other member's of the patient's health care team. Time spent in documentation and care coordination is included.     Mara WARD, CNP

## 2022-08-09 NOTE — NURSING NOTE
Prasanna Baldwin is a 62 year old adult patient that presents today in clinic for the following:    Chief Complaint   Patient presents with     Physical     Routine, mammogram, Tetanus Shot, Shingrix     The patient's allergies and medications were reviewed as noted. A set of vitals were recorded as noted without incident. The patient does not have any other questions for the provider.    Luis Hatch, EMT at 2:34 PM on 8/9/2022

## 2022-08-22 ENCOUNTER — THERAPY VISIT (OUTPATIENT)
Dept: PHYSICAL THERAPY | Facility: CLINIC | Age: 62
End: 2022-08-22
Payer: COMMERCIAL

## 2022-08-22 DIAGNOSIS — M25.512 CHRONIC PAIN OF BOTH SHOULDERS: Primary | ICD-10-CM

## 2022-08-22 DIAGNOSIS — G89.29 CHRONIC PAIN OF BOTH SHOULDERS: Primary | ICD-10-CM

## 2022-08-22 DIAGNOSIS — M25.511 CHRONIC PAIN OF BOTH SHOULDERS: Primary | ICD-10-CM

## 2022-08-22 PROCEDURE — 97140 MANUAL THERAPY 1/> REGIONS: CPT | Mod: GP | Performed by: PHYSICAL THERAPIST

## 2022-08-22 PROCEDURE — 97110 THERAPEUTIC EXERCISES: CPT | Mod: GP | Performed by: PHYSICAL THERAPIST

## 2022-08-22 PROCEDURE — 97035 APP MDLTY 1+ULTRASOUND EA 15: CPT | Mod: GP | Performed by: PHYSICAL THERAPIST

## 2022-08-22 NOTE — PROGRESS NOTES
Subjective:  HPI  Physical Exam                    Objective:  System    Physical Exam    General     ROS    Assessment/Plan:    PROGRESS  REPORT    Progress reporting period is from 6-3-22 to 8-22-22    SUBJECTIVE  Subjective changes noted by patient:   Pt. has not been into PT the past 4 weeks and reports increased stiffness/soreness in general as a result. Pt. admittedly has not been as consistent with the home ex's. Pt. cont to have increased ROM and strength since starting PT compared to previously.      Current pain level is  4/10.     Previous pain level was  7/10.   Changes in function:  Yes (See Goal flowsheet attached for changes in current functional level)  Adverse reaction to treatment or activity: None    OBJECTIVE  Changes noted in objective findings:  AROM L sh flex 135; abd 120; IR 75; ER 55. R sh flex 125; abd 120; IR 54; ER 32.     ASSESSMENT/PLAN  Updated problem list and treatment plan: Diagnosis 1:  B shoulder pain  Pain -  US, manual therapy, self management and education  Decreased ROM/flexibility - manual therapy and therapeutic exercise  Decreased strength - therapeutic exercise and therapeutic activities  Impaired muscle performance - neuro re-education  Decreased function - therapeutic activities  STG/LTGs have been met or progress has been made towards goals:  Yes (See Goal flow sheet completed today.)  Assessment of Progress: The patient's condition is improving gradually  Self Management Plans:  Patient has been instructed in a home treatment program.  Patient  has been instructed in self management of symptoms.  I have re-evaluated this patient and find that the nature, scope, duration and intensity of the therapy is appropriate for the medical condition of the patient.  Prasanna continues to require the following intervention to meet STG and LTG's:  PT    Recommendations:  This patient would benefit from continued therapy.     Frequency:  2 X a month, once daily  Duration:  for 2  months        Please refer to the daily flowsheet for treatment today, total treatment time and time spent performing 1:1 timed codes.

## 2022-09-02 ENCOUNTER — ANCILLARY PROCEDURE (OUTPATIENT)
Dept: MAMMOGRAPHY | Facility: CLINIC | Age: 62
End: 2022-09-02
Attending: NURSE PRACTITIONER
Payer: COMMERCIAL

## 2022-09-02 DIAGNOSIS — Z12.31 ENCOUNTER FOR SCREENING MAMMOGRAM FOR BREAST CANCER: ICD-10-CM

## 2022-09-02 PROCEDURE — 77067 SCR MAMMO BI INCL CAD: CPT | Mod: TC | Performed by: RADIOLOGY

## 2022-09-12 ENCOUNTER — THERAPY VISIT (OUTPATIENT)
Dept: PHYSICAL THERAPY | Facility: CLINIC | Age: 62
End: 2022-09-12
Payer: COMMERCIAL

## 2022-09-12 DIAGNOSIS — M25.512 CHRONIC PAIN OF BOTH SHOULDERS: Primary | ICD-10-CM

## 2022-09-12 DIAGNOSIS — M25.511 CHRONIC PAIN OF BOTH SHOULDERS: Primary | ICD-10-CM

## 2022-09-12 DIAGNOSIS — G89.29 CHRONIC PAIN OF BOTH SHOULDERS: Primary | ICD-10-CM

## 2022-09-12 PROCEDURE — 97035 APP MDLTY 1+ULTRASOUND EA 15: CPT | Mod: GP | Performed by: PHYSICAL THERAPIST

## 2022-09-12 PROCEDURE — 97140 MANUAL THERAPY 1/> REGIONS: CPT | Mod: GP | Performed by: PHYSICAL THERAPIST

## 2022-09-12 PROCEDURE — 97110 THERAPEUTIC EXERCISES: CPT | Mod: GP | Performed by: PHYSICAL THERAPIST

## 2022-09-26 ENCOUNTER — THERAPY VISIT (OUTPATIENT)
Dept: PHYSICAL THERAPY | Facility: CLINIC | Age: 62
End: 2022-09-26
Payer: COMMERCIAL

## 2022-09-26 DIAGNOSIS — M25.512 CHRONIC PAIN OF BOTH SHOULDERS: Primary | ICD-10-CM

## 2022-09-26 DIAGNOSIS — M25.511 CHRONIC PAIN OF BOTH SHOULDERS: Primary | ICD-10-CM

## 2022-09-26 DIAGNOSIS — G89.29 CHRONIC PAIN OF BOTH SHOULDERS: Primary | ICD-10-CM

## 2022-09-26 PROCEDURE — 97110 THERAPEUTIC EXERCISES: CPT | Mod: GP | Performed by: PHYSICAL THERAPIST

## 2022-09-26 PROCEDURE — 97035 APP MDLTY 1+ULTRASOUND EA 15: CPT | Mod: GP | Performed by: PHYSICAL THERAPIST

## 2022-09-26 PROCEDURE — 97140 MANUAL THERAPY 1/> REGIONS: CPT | Mod: GP | Performed by: PHYSICAL THERAPIST

## 2022-09-26 NOTE — PROGRESS NOTES
Subjective:  HPI  Physical Exam                    Objective:  System    Physical Exam    General     ROS    Assessment/Plan:    SUBJECTIVE  Subjective changes as noted by pt:   Pt. reports increased soreness and stiffness this week more so than past weeks. Pt. got  last weekend so has been very busy recently and has not done much with the home ex's lately.   Current pain level:  4/10   Changes in function:  Yes (See Goal flowsheet attached for changes in current functional level)     Adverse reaction to treatment or activity:  None    OBJECTIVE  Changes in objective findings:  AROM L sh flex 129; abd 102; IR 75; ER 58. R sh flex 118; abd 111; IR 59; ER 45.     ASSESSMENT  Prasanna continues to require intervention to meet STG and LTG's: PT  Patient is progressing slower than expected.  Response to therapy has shown an improvement in  pain level, ROM  and strength  Progress made towards STG/LTG?  Yes (See Goal flowsheet attached for updates on achievement of STG and LTG)    PLAN  Current treatment program is being advanced to more complex exercises.    PTA/ATC plan:  N/A    Please refer to the daily flowsheet for treatment today, total treatment time and time spent performing 1:1 timed codes.

## 2022-10-01 LAB — NONINV COLON CA DNA+OCC BLD SCRN STL QL: POSITIVE

## 2022-10-10 ENCOUNTER — THERAPY VISIT (OUTPATIENT)
Dept: PHYSICAL THERAPY | Facility: CLINIC | Age: 62
End: 2022-10-10
Payer: COMMERCIAL

## 2022-10-10 DIAGNOSIS — M25.511 CHRONIC PAIN OF BOTH SHOULDERS: Primary | ICD-10-CM

## 2022-10-10 DIAGNOSIS — G89.29 CHRONIC PAIN OF BOTH SHOULDERS: Primary | ICD-10-CM

## 2022-10-10 DIAGNOSIS — M25.512 CHRONIC PAIN OF BOTH SHOULDERS: Primary | ICD-10-CM

## 2022-10-10 PROCEDURE — 97140 MANUAL THERAPY 1/> REGIONS: CPT | Mod: GP | Performed by: PHYSICAL THERAPIST

## 2022-10-10 PROCEDURE — 97035 APP MDLTY 1+ULTRASOUND EA 15: CPT | Mod: GP | Performed by: PHYSICAL THERAPIST

## 2022-10-10 PROCEDURE — 97110 THERAPEUTIC EXERCISES: CPT | Mod: GP | Performed by: PHYSICAL THERAPIST

## 2022-10-10 NOTE — PROGRESS NOTES
Subjective:  HPI  Physical Exam                    Objective:  System    Physical Exam    General     ROS    Assessment/Plan:    SUBJECTIVE  Subjective changes as noted by pt:   Pt. has had a better 2 weeks this period with decreased shoulder pain and better ROM.   Current pain level: 3/10   Changes in function:  Yes (See Goal flowsheet attached for changes in current functional level)     Adverse reaction to treatment or activity:  None    OBJECTIVE  Changes in objective findings:  AROM L sh flex 135; abd 117; IR 75; ER 54. R sh flex 125; abd 108; IR 68; ER 35.     ASSESSMENT  Prasanna continues to require intervention to meet STG and LTG's: PT  Patient is progressing as expected.  Response to therapy has shown an improvement in  pain level, ROM  and strength  Progress made towards STG/LTG?  Yes (See Goal flowsheet attached for updates on achievement of STG and LTG)    PLAN  Current treatment program is being advanced to more complex exercises.    PTA/ATC plan:  N/A    Please refer to the daily flowsheet for treatment today, total treatment time and time spent performing 1:1 timed codes.

## 2022-10-23 ENCOUNTER — HEALTH MAINTENANCE LETTER (OUTPATIENT)
Age: 62
End: 2022-10-23

## 2022-11-07 ENCOUNTER — THERAPY VISIT (OUTPATIENT)
Dept: PHYSICAL THERAPY | Facility: CLINIC | Age: 62
End: 2022-11-07
Payer: COMMERCIAL

## 2022-11-07 DIAGNOSIS — M25.511 CHRONIC PAIN OF BOTH SHOULDERS: Primary | ICD-10-CM

## 2022-11-07 DIAGNOSIS — G89.29 CHRONIC PAIN OF BOTH SHOULDERS: Primary | ICD-10-CM

## 2022-11-07 DIAGNOSIS — M25.512 CHRONIC PAIN OF BOTH SHOULDERS: Primary | ICD-10-CM

## 2022-11-07 PROCEDURE — 97035 APP MDLTY 1+ULTRASOUND EA 15: CPT | Mod: GP | Performed by: PHYSICAL THERAPIST

## 2022-11-07 PROCEDURE — 97110 THERAPEUTIC EXERCISES: CPT | Mod: GP | Performed by: PHYSICAL THERAPIST

## 2022-11-07 PROCEDURE — 97140 MANUAL THERAPY 1/> REGIONS: CPT | Mod: GP | Performed by: PHYSICAL THERAPIST

## 2022-11-08 NOTE — PROGRESS NOTES
"Subjective:  HPI  Physical Exam                    Objective:  System    Physical Exam    General     ROS    Assessment/Plan:    PROGRESS  REPORT    Progress reporting period is from 8-22-22 to 11-7-22.       SUBJECTIVE  Subjective changes noted by patient:   Pt. returns from a 3 week vacation. She did not do much with her home ex's while away. The shoulder \"feels stiff today.\"  Overall, patient has made progress with ROM and strength along with decreased shoulder pain.     Current pain level is 3/10.     Previous pain level was  7/10.   Changes in function:  Yes (See Goal flowsheet attached for changes in current functional level)  Adverse reaction to treatment or activity: None    OBJECTIVE  Changes noted in objective findings:  AROM L sh flex 127; abd 119; IR 75; ER 55. R sh flex 121; abd 108; IR 68; ER 36.     ASSESSMENT/PLAN  Updated problem list and treatment plan: Diagnosis 1:  B shoulder pain  Pain -  US, manual therapy, self management and education  Decreased ROM/flexibility - manual therapy and therapeutic exercise  Decreased strength - therapeutic exercise and therapeutic activities  Impaired muscle performance - neuro re-education  Decreased function - therapeutic activities  STG/LTGs have been met or progress has been made towards goals:  Yes (See Goal flow sheet completed today.)  Assessment of Progress: The patient's condition is improving slowly.  Self Management Plans:  Patient has been instructed in a home treatment program.  Patient  has been instructed in self management of symptoms.  I have re-evaluated this patient and find that the nature, scope, duration and intensity of the therapy is appropriate for the medical condition of the patient.  Prasanna continues to require the following intervention to meet STG and LTG's:  PT    Recommendations:  This patient would benefit from continued therapy.     Frequency:  2 X a month, once daily  Duration:  for 2 months        Please refer to the daily " flowsheet for treatment today, total treatment time and time spent performing 1:1 timed codes.

## 2022-11-14 ENCOUNTER — OFFICE VISIT (OUTPATIENT)
Dept: FAMILY MEDICINE | Facility: CLINIC | Age: 62
End: 2022-11-14
Payer: COMMERCIAL

## 2022-11-14 VITALS
BODY MASS INDEX: 24.32 KG/M2 | WEIGHT: 183.5 LBS | RESPIRATION RATE: 16 BRPM | DIASTOLIC BLOOD PRESSURE: 88 MMHG | TEMPERATURE: 98.2 F | SYSTOLIC BLOOD PRESSURE: 131 MMHG | HEIGHT: 73 IN | HEART RATE: 80 BPM | OXYGEN SATURATION: 98 %

## 2022-11-14 DIAGNOSIS — F64.9 GENDER DYSPHORIA: ICD-10-CM

## 2022-11-14 DIAGNOSIS — F43.20 ADJUSTMENT DISORDER, UNSPECIFIED TYPE: ICD-10-CM

## 2022-11-14 DIAGNOSIS — Q98.4 KLINEFELTER'S SYNDROME: ICD-10-CM

## 2022-11-14 DIAGNOSIS — Z12.11 COLON CANCER SCREENING: ICD-10-CM

## 2022-11-14 DIAGNOSIS — F43.21 ADJUSTMENT DISORDER WITH DEPRESSED MOOD: Primary | ICD-10-CM

## 2022-11-14 DIAGNOSIS — K21.9 GASTROESOPHAGEAL REFLUX DISEASE WITHOUT ESOPHAGITIS: ICD-10-CM

## 2022-11-14 PROCEDURE — 99396 PREV VISIT EST AGE 40-64: CPT | Performed by: INTERNAL MEDICINE

## 2022-11-14 RX ORDER — BUPROPION HYDROCHLORIDE 300 MG/1
300 TABLET ORAL EVERY MORNING
Qty: 90 TABLET | Refills: 3 | Status: SHIPPED | OUTPATIENT
Start: 2022-11-14 | End: 2023-12-07

## 2022-11-14 RX ORDER — BUPROPION HYDROCHLORIDE 150 MG/1
150 TABLET ORAL EVERY MORNING
Qty: 90 TABLET | Refills: 3 | Status: SHIPPED | OUTPATIENT
Start: 2022-11-14 | End: 2023-12-07

## 2022-11-14 RX ORDER — TRAZODONE HYDROCHLORIDE 100 MG/1
100 TABLET ORAL
Qty: 90 TABLET | Refills: 3 | Status: SHIPPED | OUTPATIENT
Start: 2022-11-14

## 2022-11-14 ASSESSMENT — PATIENT HEALTH QUESTIONNAIRE - PHQ9
10. IF YOU CHECKED OFF ANY PROBLEMS, HOW DIFFICULT HAVE THESE PROBLEMS MADE IT FOR YOU TO DO YOUR WORK, TAKE CARE OF THINGS AT HOME, OR GET ALONG WITH OTHER PEOPLE: NOT DIFFICULT AT ALL
SUM OF ALL RESPONSES TO PHQ QUESTIONS 1-9: 1
SUM OF ALL RESPONSES TO PHQ QUESTIONS 1-9: 1

## 2022-11-14 ASSESSMENT — ENCOUNTER SYMPTOMS
HEARTBURN: 0
COUGH: 0
DIARRHEA: 0
NAUSEA: 0
JOINT SWELLING: 0
WEAKNESS: 0
EYE PAIN: 0
CHILLS: 0
PARESTHESIAS: 0
HEMATOCHEZIA: 0
DIZZINESS: 0
FREQUENCY: 0
PALPITATIONS: 0
DYSURIA: 0
MYALGIAS: 0
SORE THROAT: 0
FEVER: 0
CONSTIPATION: 0
HEADACHES: 0
ABDOMINAL PAIN: 0
NERVOUS/ANXIOUS: 0
HEMATURIA: 0
SHORTNESS OF BREATH: 0
ARTHRALGIAS: 0

## 2022-11-14 ASSESSMENT — PAIN SCALES - GENERAL: PAINLEVEL: NO PAIN (0)
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
## 2022-11-21 ENCOUNTER — TELEPHONE (OUTPATIENT)
Dept: GASTROENTEROLOGY | Facility: CLINIC | Age: 62
End: 2022-11-21

## 2022-11-21 ENCOUNTER — THERAPY VISIT (OUTPATIENT)
Dept: PHYSICAL THERAPY | Facility: CLINIC | Age: 62
End: 2022-11-21
Payer: COMMERCIAL

## 2022-11-21 DIAGNOSIS — G89.29 CHRONIC PAIN OF BOTH SHOULDERS: Primary | ICD-10-CM

## 2022-11-21 DIAGNOSIS — M25.512 CHRONIC PAIN OF BOTH SHOULDERS: Primary | ICD-10-CM

## 2022-11-21 DIAGNOSIS — M25.511 CHRONIC PAIN OF BOTH SHOULDERS: Primary | ICD-10-CM

## 2022-11-21 PROCEDURE — 97035 APP MDLTY 1+ULTRASOUND EA 15: CPT | Mod: GP | Performed by: PHYSICAL THERAPIST

## 2022-11-21 PROCEDURE — 97140 MANUAL THERAPY 1/> REGIONS: CPT | Mod: GP | Performed by: PHYSICAL THERAPIST

## 2022-11-21 PROCEDURE — 97110 THERAPEUTIC EXERCISES: CPT | Mod: GP | Performed by: PHYSICAL THERAPIST

## 2022-11-21 NOTE — PROGRESS NOTES
Subjective:  HPI  Physical Exam                    Objective:  System    Physical Exam    General     ROS    Assessment/Plan:    SUBJECTIVE  Subjective changes as noted by pt:   Pt. reports a good 2 week stretch since last visit with decreased B sh pain and increasing ROM. Pt. admits to being inconsistent with her HEP.     Current pain level:  3/10   Changes in function:  Yes (See Goal flowsheet attached for changes in current functional level)     Adverse reaction to treatment or activity:  None    OBJECTIVE  Changes in objective findings:  AROM L sh flex 137; abd 125; IR 75; ER 55. R sh flex 132; abd 121; IR 64; ER 35.     ASSESSMENT  Prasanna continues to require intervention to meet STG and LTG's: PT  Patient's symptoms are resolving.  Response to therapy has shown an improvement in  pain level and ROM   Progress made towards STG/LTG?  Yes (See Goal flowsheet attached for updates on achievement of STG and LTG)    PLAN  Current treatment program is being advanced to more complex exercises.    PTA/ATC plan:  N/A    Please refer to the daily flowsheet for treatment today, total treatment time and time spent performing 1:1 timed codes.

## 2022-11-21 NOTE — TELEPHONE ENCOUNTER
Screening Questions  BLUE  KIND OF PREP RED  LOCATION [review exclusion criteria] GREEN  SEDATION TYPE        Y Are you active on mychart?       Sky Ellison Ordering/Referring Provider?        [facility] What type of coverage do you have?      N Have you had a positive covid test in the last 90 days?     24.21 1. BMI  [BMI 40+ - review exclusion criteria]    Y  2. Are you able to give consent for your medical care? [IF NO,RN REVIEW]        N  3. Are you taking any prescription pain medications on a routine schedule?      N  3a. EXTENDED PREP What kind of prescription?     N 4. Do you have any chemical dependencies such as alcohol, street drugs, or methadone?    N 5. Do you have any history of post-traumatic stress syndrome, severe anxiety or history of psychosis?      **If yes 3- 5 , please schedule with MAC sedation.**          IF YES TO ANY 6 - 10 - HOSPITAL SETTING ONLY.     N 6.   Do you need assistance transferring?     N 7.   Have you had a heart or lung transplant?    N 8.   Are you currently on dialysis?   N 9.   Do you use daily home oxygen?   N 10. Do you take nitroglycerin?   10a.  If yes, how often?     11. [FEMALES]  N Are you currently pregnant?    11a. N If yes, how many weeks? [ Greater than 12 weeks, OR NEEDED]    N 12. Do you have Pulmonary Hypertension? *NEED PAC APPT AT [facility]*     N 13. [review exclusion criteria]  Do you have any implantable devices in your body (pacemaker, defib, LVAD)?    N 14. In the past 6 months, have you had any heart related issues including cardiomyopathy or heart attack?     14a. N If yes, did it require cardiac stenting if so when?     N 15. Have you had a stroke or Transient ischemic attack (TIA - aka  mini stroke ) within 6 months?      N 16. Do you have mod to severe Obstructive Sleep Apnea?  [Hospital only - Ok at [place]]    N 17. Do you have SEVERE AND UNCONTROLLED asthma? *NEED PAC APPT AT [facility]*     N 18. Are you currently taking any blood  "thinners?     18a. If yes, inform patient to \"follow up w/ ordering provider for bridging instructions.\"    N 19. Do you take the medication Phentermine?    19a. If yes, \"Hold for 7 days before procedure.  Please consult your prescribing provider if you have questions about holding this medication.\"     N  20. Do you have chronic kidney disease?      N  21. Do you have a diagnosis of diabetes?     N  22. On a regular basis do you go 3-5 days between bowel movements?      23. Preferred LOCAL Pharmacy for Pre Prescription    [ LIST ONLY ONE PHARMACY]          [id number] - [place], [place] - [address] & [facility]        - CLOSING REMINDERS -    Informed patient they will need an adult    Cannot take any type of public or medical transportation alone    Conscious Sedation- Needs  for 6 hours after the procedure       MAC/General-Needs  for 24 hours after procedure    Pre-Procedure Covid test to be completed [[facility] PCR Testing Required]    Confirmed Nurse will call to complete assessment       - SCHEDULING DETAILS -     VISHNU  Surgeon    1/23/23  Date of Procedure  Lower Endoscopy [Colonoscopy]  Type of Procedure Scheduled  [facility] [place]   STANDARD GOLYTELY-If you answer yes to questions #8, #20, #21Which Colonoscopy Prep was Sent?     MODERATE Sedation Type     N PAC / Pre-op Required         Additional comments:            "

## 2022-12-05 ENCOUNTER — THERAPY VISIT (OUTPATIENT)
Dept: PHYSICAL THERAPY | Facility: CLINIC | Age: 62
End: 2022-12-05
Payer: COMMERCIAL

## 2022-12-05 DIAGNOSIS — M25.511 CHRONIC PAIN OF BOTH SHOULDERS: Primary | ICD-10-CM

## 2022-12-05 DIAGNOSIS — G89.29 CHRONIC PAIN OF BOTH SHOULDERS: Primary | ICD-10-CM

## 2022-12-05 DIAGNOSIS — M25.512 CHRONIC PAIN OF BOTH SHOULDERS: Primary | ICD-10-CM

## 2022-12-05 PROCEDURE — 97035 APP MDLTY 1+ULTRASOUND EA 15: CPT | Mod: GP | Performed by: PHYSICAL THERAPIST

## 2022-12-05 PROCEDURE — 97110 THERAPEUTIC EXERCISES: CPT | Mod: GP | Performed by: PHYSICAL THERAPIST

## 2022-12-05 PROCEDURE — 97140 MANUAL THERAPY 1/> REGIONS: CPT | Mod: GP | Performed by: PHYSICAL THERAPIST

## 2022-12-06 NOTE — PROGRESS NOTES
"Subjective:  HPI  Physical Exam                    Objective:  System    Physical Exam    General     ROS    Assessment/Plan:    SUBJECTIVE  Subjective changes as noted by pt:   Pt. has some pain with sleeping on the L shoulder. ROM has been \"about the same.\"   Current pain level:  3/10   Changes in function:  Yes (See Goal flowsheet attached for changes in current functional level)     Adverse reaction to treatment or activity:  None    OBJECTIVE  Changes in objective findings: AROM L sh flex 132; abd 122; IR 75; ER 60. R sh flex 125; abd 115; IR 59; ER 36     ASSESSMENT  Prasanna continues to require intervention to meet STG and LTG's: PT  Patient is progressing slower than expected.  Response to therapy has shown an improvement in  pain level and ROM   Progress made towards STG/LTG?  Yes (See Goal flowsheet attached for updates on achievement of STG and LTG)    PLAN  Current treatment program is being advanced to more complex exercises.    PTA/ATC plan:  N/A    Please refer to the daily flowsheet for treatment today, total treatment time and time spent performing 1:1 timed codes.        "

## 2023-01-02 ENCOUNTER — THERAPY VISIT (OUTPATIENT)
Dept: PHYSICAL THERAPY | Facility: CLINIC | Age: 63
End: 2023-01-02
Payer: COMMERCIAL

## 2023-01-02 DIAGNOSIS — M25.511 CHRONIC PAIN OF BOTH SHOULDERS: Primary | ICD-10-CM

## 2023-01-02 DIAGNOSIS — M25.512 CHRONIC PAIN OF BOTH SHOULDERS: Primary | ICD-10-CM

## 2023-01-02 DIAGNOSIS — G89.29 CHRONIC PAIN OF BOTH SHOULDERS: Primary | ICD-10-CM

## 2023-01-02 PROCEDURE — 97140 MANUAL THERAPY 1/> REGIONS: CPT | Mod: GP | Performed by: PHYSICAL THERAPIST

## 2023-01-02 PROCEDURE — 97035 APP MDLTY 1+ULTRASOUND EA 15: CPT | Mod: GP | Performed by: PHYSICAL THERAPIST

## 2023-01-02 PROCEDURE — 97110 THERAPEUTIC EXERCISES: CPT | Mod: GP | Performed by: PHYSICAL THERAPIST

## 2023-01-02 NOTE — PROGRESS NOTES
Subjective:  HPI  Physical Exam                    Objective:  System    Physical Exam    General     ROS    Assessment/Plan:    PROGRESS  REPORT    Progress reporting period is from 11-7-22 to 1-2-23.       SUBJECTIVE  Subjective changes noted by patient:   Pt. has made slow gradual progress in recent months with her shoulder ROM and strength. Pt. has been inconsistent with her home exercises. She has less pain overall and better ROM than initially but her progress has slowed and ROM has been fluctuating.    Current pain level is 3/10.     Previous pain level was  7/10.   Changes in function:  Yes (See Goal flowsheet attached for changes in current functional level)  Adverse reaction to treatment or activity: None    OBJECTIVE  Changes noted in objective findings:  AROM L sh flex 140; abd 122; IR 75; ER 62. R sh flex 125; abd 116; IR 58; ER 26.     ASSESSMENT/PLAN  Updated problem list and treatment plan: Diagnosis 1:  B shoulder pain  Pain -  US, manual therapy, self management and education  Decreased ROM/flexibility - manual therapy and therapeutic exercise  Decreased strength - therapeutic exercise and therapeutic activities  Impaired muscle performance - neuro re-education  Decreased function - therapeutic activities  STG/LTGs have been met or progress has been made towards goals:  Yes (See Goal flow sheet completed today.)  Assessment of Progress: The patient's progress has plateaued.  Self Management Plans:  Patient has been instructed in a home treatment program.  Patient  has been instructed in self management of symptoms.  I have re-evaluated this patient and find that the nature, scope, duration and intensity of the therapy is appropriate for the medical condition of the patient.  Prasanna continues to require the following intervention to meet STG and LTG's:  PT    Recommendations:  This patient would benefit from continued therapy.     Frequency:  2 X a month, once daily  Duration:  for 2  months        Please refer to the daily flowsheet for treatment today, total treatment time and time spent performing 1:1 timed codes.

## 2023-01-16 RX ORDER — BISACODYL 5 MG
TABLET, DELAYED RELEASE (ENTERIC COATED) ORAL
Qty: 4 TABLET | Refills: 0 | Status: SHIPPED | OUTPATIENT
Start: 2023-01-16 | End: 2023-02-28

## 2023-01-22 RX ORDER — LIDOCAINE 40 MG/G
CREAM TOPICAL
Status: CANCELLED | OUTPATIENT
Start: 2023-01-22

## 2023-01-22 RX ORDER — ONDANSETRON 2 MG/ML
4 INJECTION INTRAMUSCULAR; INTRAVENOUS
Status: CANCELLED | OUTPATIENT
Start: 2023-01-22

## 2023-01-23 ENCOUNTER — HOSPITAL ENCOUNTER (OUTPATIENT)
Facility: CLINIC | Age: 63
Discharge: HOME | End: 2023-01-23
Attending: COLON & RECTAL SURGERY | Admitting: COLON & RECTAL SURGERY
Payer: COMMERCIAL

## 2023-01-23 VITALS
SYSTOLIC BLOOD PRESSURE: 131 MMHG | RESPIRATION RATE: 13 BRPM | HEART RATE: 68 BPM | OXYGEN SATURATION: 100 % | DIASTOLIC BLOOD PRESSURE: 82 MMHG

## 2023-01-23 DIAGNOSIS — Z12.11 ENCOUNTER FOR SCREENING COLONOSCOPY: Primary | ICD-10-CM

## 2023-01-23 LAB — COLONOSCOPY: NORMAL

## 2023-01-23 PROCEDURE — 88305 TISSUE EXAM BY PATHOLOGIST: CPT | Mod: TC | Performed by: COLON & RECTAL SURGERY

## 2023-01-23 PROCEDURE — 45385 COLONOSCOPY W/LESION REMOVAL: CPT | Performed by: COLON & RECTAL SURGERY

## 2023-01-23 ASSESSMENT — ACTIVITIES OF DAILY LIVING (ADL): ADLS_ACUITY_SCORE: 35

## 2023-01-23 NOTE — H&P
Colon & Rectal Surgery History and Physical  Pre-Endoscopy Procedure Note    History of Present Illness   I have been asked by  to evaluate this 62 year old adult for colorectal cancer screening. She currently denies any abdominal pain, weight loss, bleeding per rectum, or recent change in bowel habits.    Past Medical History  Diagnosis Date     Adjustment disorder with depressed mood 12/17/2020     Alcohol abuse      Depression      Gender dysphoria      GERD (gastroesophageal reflux disease)      Klinefelter syndrome      Male-to-female transgender person 12/3/2018     Right rib fracture     9th rib, non-displaced.      Scapulothoracic syndrome        Past Surgical History  Procedure Laterality Date     ARTHROSCOPIC RECONSTRUCTION ANTERIOR CRUCIATE LIGAMENT  8/1/2001     ARTHROSCOPIC REPAIR ACL       CHOLECYSTECTOMY  8/23/2006        Medications  Medication Sig     buPROPion (WELLBUTRIN XL) 150 MG 24 hr tablet Take 1 tablet (150 mg) by mouth every morning With 300mg for total of 450mg     buPROPion (WELLBUTRIN XL) 300 MG 24 hr tablet Take 1 tablet (300 mg) by mouth every morning With 150mg for total of 450mg     estradiol (ESTRACE) 2 MG tablet Take 4 tablets (8 mg) by mouth daily     Multiple Vitamins-Minerals (EMERGEN-C IMMUNE PO) Take 1 tablet by mouth daily     progesterone (PROMETRIUM) 100 MG capsule Take 200 mg by mouth daily      spironolactone (ALDACTONE) 50 MG tablet Take 2 tablets (100 mg) by mouth daily     traZODone (DESYREL) 100 MG tablet Take 1 tablet (100 mg) by mouth nightly as needed for sleep       Allergies  Allergen Reactions     Influenza Vaccines      Adverse reaction        Family History   Family history includes Abdominal Aortic Aneurysm in her father; Cancer in her paternal uncle; Dementia in her father; Diabetes Type 2  in her father; Pancreatic Cancer in her father; Prostate Cancer in her father; Rheumatoid Arthritis in her mother.     Social History   She reports that she quit  smoking about 18 months ago. Her smoking use included pipe and cigarettes. She has never used smokeless tobacco. She reports that she does not currently use alcohol. She reports that she does not use drugs.    Review of Systems   Constitutional:  No fever, weight change or fatigue.    Eyes:     No dry eyes or vision changes.   Ears/Nose/Throat/Neck:  No oral ulcers, sore throat or voice change.    Cardiovascular:   No palpitations, syncope, angina or edema.   Respiratory:    No chest pain, excessive sleepiness, shortness of breath or hemoptysis.    Gastrointestinal:   No abdominal pain, nausea, vomiting, diarrhea or heartburn.    Genitourinary:   No dysuria, hematuria, urinary retention or urinary frequency.   Musculoskeletal:  No joint swelling or arthralgias.    Dermatologic:  No skin rash or other skin changes.   Neurologic:    No focal weakness or numbness. No neuropathy.   Psychiatric:    No depression, anxiety, suicidal ideation, or paranoid ideation.   Endocrine:   No cold or heat intolerance, polydipsia, hirsutism, change in libido, or flushing.   Hematology/Lymphatic:  No bleeding or lymphadenopathy.    Allergy/Immunology:  No rhinitis or hives.     Physical Exam   Vitals:  /76, HR 65 RR 12, SpO2 100% room air    General:  Alert and oriented to person, place and time   Airway: Normal oropharyngeal airway and neck mobility   Lungs:  Clear bilaterally   Heart:  Regular rate and rhythm   Abdomen: Soft, NT, ND, no masses   Extremities: Warm, good capillary refill    ASA Grade: II (mild systemic disease)    Impression: Cleared for use of conscious sedation for colorectal cancer screening    Plan: Proceed with colonoscopy     Suzanna Zimmer MD  [facility]  [phone]

## 2023-01-24 LAB
PATH REPORT.COMMENTS IMP SPEC: NORMAL
PATH REPORT.COMMENTS IMP SPEC: NORMAL
PATH REPORT.FINAL DX SPEC: NORMAL
PATH REPORT.GROSS SPEC: NORMAL
PATH REPORT.MICROSCOPIC SPEC OTHER STN: NORMAL
PATH REPORT.RELEVANT HX SPEC: NORMAL
PHOTO IMAGE: NORMAL

## 2023-01-24 PROCEDURE — 88305 TISSUE EXAM BY PATHOLOGIST: CPT | Mod: 26 | Performed by: PATHOLOGY

## 2023-01-30 ENCOUNTER — THERAPY VISIT (OUTPATIENT)
Dept: PHYSICAL THERAPY | Facility: CLINIC | Age: 63
End: 2023-01-30
Payer: COMMERCIAL

## 2023-01-30 DIAGNOSIS — G89.29 CHRONIC PAIN OF BOTH SHOULDERS: Primary | ICD-10-CM

## 2023-01-30 DIAGNOSIS — M25.512 CHRONIC PAIN OF BOTH SHOULDERS: Primary | ICD-10-CM

## 2023-01-30 DIAGNOSIS — M25.511 CHRONIC PAIN OF BOTH SHOULDERS: Primary | ICD-10-CM

## 2023-01-30 PROCEDURE — 97140 MANUAL THERAPY 1/> REGIONS: CPT | Mod: GP | Performed by: PHYSICAL THERAPIST

## 2023-01-30 PROCEDURE — 97110 THERAPEUTIC EXERCISES: CPT | Mod: GP | Performed by: PHYSICAL THERAPIST

## 2023-01-30 PROCEDURE — 97035 APP MDLTY 1+ULTRASOUND EA 15: CPT | Mod: GP | Performed by: PHYSICAL THERAPIST

## 2023-01-31 NOTE — PROGRESS NOTES
Subjective:  HPI  Physical Exam                    Objective:  System    Physical Exam    General     ROS    Assessment/Plan:    SUBJECTIVE  Subjective changes as noted by pt:   Pt. reports stiffness and some soreness in both shoulders with sleeping. However, pt cont to use the arms more with home and work activities.   Current pain level:  4/10   Changes in function:  Yes (See Goal flowsheet attached for changes in current functional level)     Adverse reaction to treatment or activity:  None    OBJECTIVE  Changes in objective findings:  AROM L sh flex 142; abd 122; IR 75; ER 62. R sh flex 130; abd 116; IR 45; ER 30.     ASSESSMENT  Prasanna continues to require intervention to meet STG and LTG's: PT  Patient is progressing slower than expected.  Response to therapy has shown an improvement in  pain level and ROM   Progress made towards STG/LTG?  Yes (See Goal flowsheet attached for updates on achievement of STG and LTG)    PLAN  Current treatment program is being advanced to more complex exercises.    PTA/ATC plan:  N/A    Please refer to the daily flowsheet for treatment today, total treatment time and time spent performing 1:1 timed codes.

## 2023-02-28 ENCOUNTER — OFFICE VISIT (OUTPATIENT)
Dept: NEUROLOGY | Facility: CLINIC | Age: 63
End: 2023-02-28
Payer: COMMERCIAL

## 2023-02-28 VITALS
SYSTOLIC BLOOD PRESSURE: 133 MMHG | DIASTOLIC BLOOD PRESSURE: 92 MMHG | BODY MASS INDEX: 24.14 KG/M2 | HEART RATE: 81 BPM | WEIGHT: 183 LBS

## 2023-02-28 DIAGNOSIS — G56.80 SCAPULOTHORACIC SYNDROME: Primary | ICD-10-CM

## 2023-02-28 PROCEDURE — 99213 OFFICE O/P EST LOW 20 MIN: CPT | Performed by: PSYCHIATRY & NEUROLOGY

## 2023-02-28 NOTE — LETTER
"    2023         RE: Prasanna Irizarry  [address]  [facility] [address]        Dear Colleague,    Thank you for referring your patient, Prasanna Irizarry, to the [facility] [place]. Please see a copy of my visit note below.    Visit Date: 2023        Mara Beard NP  Internal Medicine  [facility]  [address] [place] [address]  [id number]    Patient: Prasanna \"Alma\" Rickie  MRN: [id number]  : 1960    Dear Mara:    I saw Alma Irizarry in followup at the [facility] in [place], where I have seen her previously for management of scapulothoracic dysfunction.  Ms. Damian reports no significant change in her functional capacity.  She has undergone physical therapy to help manage symptoms related to her right shoulder.    A directed examination demonstrates mild prominence of the medial aspect of the right scapula at rest.  This improves with forward flexion.  There is no winging with abduction of the shoulders.  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  There is moderate weakness of shoulder abduction at 90 degrees when tested with the arms fully extended, as might occur with mild deltoid weakness.  Testing of shoulder abduction with resistance placed at the upper arm, as well as testing of the infraspinatus, supraspinatus, pectoralis, and trapezius function, reveals full strength.    Dr. Damian continues to demonstrate consistent findings with reported functional deficits.  No new interventions are anticipated.    Dain Marie MD    21 minutes spent on the date of the encounter on chart review, history and examination, documentation and further activities as noted above.    D: 2023   T: 2023   MT: [facility]    Name:     PRASANNA IRIZARRY  MRN:      [id number]        " Account:    [id number]   :      1960           Visit Date: 2023     Document: [id number]    cc:  Mara Beard NP         Again, thank you for allowing me to participate in the care of your patient.        Sincerely,        Dain Marie MD

## 2023-03-01 NOTE — PROGRESS NOTES
"Visit Date: 2023        Mara Beard NP  Internal Medicine  [facility]  [address] [place], [place] [address]    Patient: Prasanna \"Alma\" Rickie  MRN: [id number]  : 1960    Dear Mara:    I saw Alma Irizarry in followup at the [facility] in [place], where I have seen her previously for management of scapulothoracic dysfunction.  Ms. Damian reports no significant change in her functional capacity.  She has undergone physical therapy to help manage symptoms related to her right shoulder.    A directed examination demonstrates mild prominence of the medial aspect of the right scapula at rest.  This improves with forward flexion.  There is no winging with abduction of the shoulders.  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  There is moderate weakness of shoulder abduction at 90 degrees when tested with the arms fully extended, as might occur with mild deltoid weakness.  Testing of shoulder abduction with resistance placed at the upper arm, as well as testing of the infraspinatus, supraspinatus, pectoralis, and trapezius function, reveals full strength.    Dr. Dmaian continues to demonstrate consistent findings with reported functional deficits.  No new interventions are anticipated.    Dain Marie MD    21 minutes spent on the date of the encounter on chart review, history and examination, documentation and further activities as noted above.    D: 2023   T: 2023   MT: [facility]    Name:     PRASANNA IRIZARRY  MRN:      [id number]        Account:    [id number]   :      1960           Visit Date: 2023     Document: [id number]    cc:  Mara Beard NP     "

## 2023-03-07 ENCOUNTER — ANCILLARY PROCEDURE (OUTPATIENT)
Dept: GENERAL RADIOLOGY | Facility: CLINIC | Age: 63
End: 2023-03-07
Attending: EMERGENCY MEDICINE
Payer: COMMERCIAL

## 2023-03-07 ENCOUNTER — OFFICE VISIT (OUTPATIENT)
Dept: URGENT CARE | Facility: URGENT CARE | Age: 63
End: 2023-03-07
Payer: COMMERCIAL

## 2023-03-07 VITALS
TEMPERATURE: 98.6 F | WEIGHT: 184 LBS | DIASTOLIC BLOOD PRESSURE: 75 MMHG | SYSTOLIC BLOOD PRESSURE: 121 MMHG | HEART RATE: 90 BPM | BODY MASS INDEX: 24.28 KG/M2 | RESPIRATION RATE: 14 BRPM | OXYGEN SATURATION: 98 %

## 2023-03-07 DIAGNOSIS — J20.9 ACUTE BRONCHITIS, UNSPECIFIED ORGANISM: ICD-10-CM

## 2023-03-07 DIAGNOSIS — J20.9 ACUTE BRONCHITIS, UNSPECIFIED ORGANISM: Primary | ICD-10-CM

## 2023-03-07 PROCEDURE — 99214 OFFICE O/P EST MOD 30 MIN: CPT | Performed by: EMERGENCY MEDICINE

## 2023-03-07 PROCEDURE — 71046 X-RAY EXAM CHEST 2 VIEWS: CPT | Mod: TC | Performed by: RADIOLOGY

## 2023-03-07 RX ORDER — BENZONATATE 100 MG/1
100 CAPSULE ORAL 2 TIMES DAILY PRN
Qty: 20 CAPSULE | Refills: 0 | Status: SHIPPED | OUTPATIENT
Start: 2023-03-07 | End: 2023-05-23

## 2023-03-07 NOTE — PATIENT INSTRUCTIONS
Follow up with your PCP or any available provider in ~3 days for recheck if worsening/no improvement. Go to the ER with worsening chest pain, shorness of breath or emergent concerns.

## 2023-03-07 NOTE — PROGRESS NOTES
Assessment & Plan     Diagnosis:  (J20.9) Acute bronchitis, unspecified organism  (primary encounter diagnosis)  Plan: XR Chest 2 Views, benzonatate (TESSALON) 100 MG        capsule, amoxicillin-clavulanate (AUGMENTIN)         875-125 MG tablet      Medical Decision Making:  Prasanna Baldwin is a 63 year old adult who presents for evaluation of productive cough, fever, chest tightness/pain for the past week.  This is consistent with an upper respiratory tract infection.  There is no signs at this point of serious bacterial infection such as OM, RPA, epiglottitis, PTA, pneumonia, sinusitis, meningitis, serious bacterial infection.  Patient notes negative COVID-19 testing at home; declines further viral testing here.    Given duration of symptoms , I did a CXR to eval for pneumonia. This shows no acute infiltrate on my read, radiology notes as per below. There are no signs of complications of bronchitis/URI at this point such as septic shock, bacteremia, empyema, hypoxia, respiratory failure or compromise.  Following shared decision making and long discussion I did elect to start patient on Augmentin given worsening symptoms, change in sputum and history of recurrent sinus bronchial infections.  Signs and symptoms are not consistent with ACS or PE; work up for this deferred given clear URI symptoms and no red flag features.    There are no gastrointestinal symptoms at this point and no signs of dehydration.  Close followup with PCP is indicated.  Go to ED for fever > 102 F, protracted vomiting, worsening shortness of breath, chest pain or other concerns.  Patient verbalized understanding and agreement with the plan. Patient was discharged from clinic in stable condition.      Enoch Morataya PA-C  [facility]    Subjective     Prasanna Baldwin is a 63 year old adult who presents  to clinic today for the following health issues:  Chief Complaint   Patient presents with     Urgent Care     URI     Per  "patient symptoms started 7 days cough dry/productive, sinus pressure/pain, fatigued, SOB, \"rasping sound\", chest congestion. Patient has done home covid test it was negative.        HPI    Onset of symptoms was ~1 week ago  Course of illness is worsening.    Severity moderate  Current and Associated symptoms: fever, cough - productive, shortness of breath, facial pain/pressure, not sleeping well and chest tightness/discomfort with coughing.   Denies wheezing, ear pain, ear drainage, sore throat, nausea, vomiting and diarrhea  Treatment measures tried include Tylenol/Ibuprofen  Predisposing factors include: Sick family member.     Patient denies any exertional chest pain, shortness of breath while at rest or with light activity, leg swelling, nausea, vomiting or difficulties swallowing food/fluids at this time.      Review of Systems    See HPI    Objective      Vitals: /75   Pulse 90   Temp 98.6  F (37  C) (Tympanic)   Resp 14   Wt 83.5 kg (184 lb)   SpO2 98%   BMI 24.28 kg/m      Patient Vitals for the past 24 hrs:   BP Temp Temp src Pulse Resp SpO2 Weight   03/07/23 1027 121/75 98.6  F (37  C) Tympanic 90 14 98 % 83.5 kg (184 lb)       Vital signs reviewed by: Enoch Morataya PA-C    Physical Exam   Constitutional: Alert and active.   Non-toxic appearing.  No acute distress.  HENT:   Right Ear: External ear normal. TM: normal  Left Ear: External ear normal. TM: normal  Nose: nasal turbinates are edematous.  Yellow rhinorrhea noted.  No septal masses or epistaxis.  Eyes: Conjunctivae, EOM and lids are normal.   Mouth: Mucous membranes are moist. Posterior oropharynx is clear. No exudates. Normal tongue and tonsil. Uvula is midline.  Neck: Normal ROM. No meningismus  Cardiovascular: Regular rate and rhythm  Pulmonary/Chest: Effort normal. Rhonchi in the left lower lung field. No respiratory distress. No rales or wheezing.   GI: Abdomen is soft and non-tender throughout.   Musculoskeletal: Normal range of " motion. No lower extremity tenderness or asymmetric edema.  Neurological: Alert and oriented x3.  Skin: No rash noted on visualized skin.       Labs/Imaging:  Results for orders placed or performed in visit on 03/07/23   XR Chest 2 Views     Status: None    Narrative    XR CHEST 2 VIEWS  3/7/2023 11:22 AM       INDICATION: Acute bronchitis  COMPARISON: 9/14/2005       Impression    IMPRESSION: Negative chest.    DULCE ROOENY MD         SYSTEM ID:  [id number]     Reading per radiology      Enoch Morataya PA-C, March 7, 2023

## 2023-03-07 NOTE — LETTER
[facility] [place]  [address]  [place] [address]  [phone]          March 7, 2023    RE:  Prasanna Baldwin                                                                                                                                                       [address]   [place] [address]            To whom it may concern:    Prasanna Baldwin is under my professional care for: UNDISCLOSED. She  may return to work with the following: The employee is UNABLE to return to work until 3/10/2023.       Sincerely,        Enoch Morataya PA-C

## 2023-05-23 ENCOUNTER — OFFICE VISIT (OUTPATIENT)
Dept: INTERNAL MEDICINE | Facility: CLINIC | Age: 63
End: 2023-05-23
Payer: COMMERCIAL

## 2023-05-23 ENCOUNTER — LAB (OUTPATIENT)
Dept: LAB | Facility: CLINIC | Age: 63
End: 2023-05-23
Payer: COMMERCIAL

## 2023-05-23 VITALS
HEIGHT: 74 IN | DIASTOLIC BLOOD PRESSURE: 82 MMHG | SYSTOLIC BLOOD PRESSURE: 120 MMHG | HEART RATE: 82 BPM | WEIGHT: 185.7 LBS | BODY MASS INDEX: 23.83 KG/M2 | OXYGEN SATURATION: 97 %

## 2023-05-23 DIAGNOSIS — R73.09 ELEVATED GLUCOSE: ICD-10-CM

## 2023-05-23 DIAGNOSIS — Z13.0 SCREENING FOR DEFICIENCY ANEMIA: ICD-10-CM

## 2023-05-23 DIAGNOSIS — Z13.29 SCREENING FOR THYROID DISORDER: ICD-10-CM

## 2023-05-23 DIAGNOSIS — N64.52 BILATERAL NIPPLE DISCHARGE: Primary | ICD-10-CM

## 2023-05-23 LAB
ANION GAP SERPL CALCULATED.3IONS-SCNC: 7 MMOL/L (ref 7–15)
BUN SERPL-MCNC: 13.9 MG/DL (ref 8–23)
CALCIUM SERPL-MCNC: 8.9 MG/DL (ref 8.8–10.2)
CHLORIDE SERPL-SCNC: 100 MMOL/L (ref 98–107)
CREAT SERPL-MCNC: 0.94 MG/DL (ref 0.51–1.17)
DEPRECATED HCO3 PLAS-SCNC: 29 MMOL/L (ref 22–29)
ERYTHROCYTE [DISTWIDTH] IN BLOOD BY AUTOMATED COUNT: 12.3 % (ref 10–15)
GFR SERPL CREATININE-BSD FRML MDRD: >90 ML/MIN/1.73M2
GLUCOSE SERPL-MCNC: 114 MG/DL (ref 70–99)
HBA1C MFR BLD: 5.8 %
HCT VFR BLD AUTO: 41.5 % (ref 35–53)
HGB BLD-MCNC: 14 G/DL (ref 11.7–17.7)
MCH RBC QN AUTO: 31.3 PG (ref 26.5–33)
MCHC RBC AUTO-ENTMCNC: 33.7 G/DL (ref 31.5–36.5)
MCV RBC AUTO: 93 FL (ref 78–100)
PLATELET # BLD AUTO: 199 10E3/UL (ref 150–450)
POTASSIUM SERPL-SCNC: 4.1 MMOL/L (ref 3.4–5.3)
RBC # BLD AUTO: 4.48 10E6/UL (ref 3.8–5.9)
SODIUM SERPL-SCNC: 136 MMOL/L (ref 136–145)
TSH SERPL DL<=0.005 MIU/L-ACNC: 0.88 UIU/ML (ref 0.3–4.2)
WBC # BLD AUTO: 6.1 10E3/UL (ref 4–11)

## 2023-05-23 PROCEDURE — 84443 ASSAY THYROID STIM HORMONE: CPT | Performed by: PATHOLOGY

## 2023-05-23 PROCEDURE — 36415 COLL VENOUS BLD VENIPUNCTURE: CPT | Performed by: PATHOLOGY

## 2023-05-23 PROCEDURE — 80048 BASIC METABOLIC PNL TOTAL CA: CPT | Performed by: PATHOLOGY

## 2023-05-23 PROCEDURE — 99396 PREV VISIT EST AGE 40-64: CPT | Performed by: NURSE PRACTITIONER

## 2023-05-23 PROCEDURE — 85027 COMPLETE CBC AUTOMATED: CPT | Performed by: PATHOLOGY

## 2023-05-23 PROCEDURE — 83036 HEMOGLOBIN GLYCOSYLATED A1C: CPT | Performed by: NURSE PRACTITIONER

## 2023-05-23 ASSESSMENT — ANXIETY QUESTIONNAIRES
GAD7 TOTAL SCORE: 1
1. FEELING NERVOUS, ANXIOUS, OR ON EDGE: SEVERAL DAYS
2. NOT BEING ABLE TO STOP OR CONTROL WORRYING: NOT AT ALL
6. BECOMING EASILY ANNOYED OR IRRITABLE: NOT AT ALL
3. WORRYING TOO MUCH ABOUT DIFFERENT THINGS: NOT AT ALL
7. FEELING AFRAID AS IF SOMETHING AWFUL MIGHT HAPPEN: NOT AT ALL
IF YOU CHECKED OFF ANY PROBLEMS ON THIS QUESTIONNAIRE, HOW DIFFICULT HAVE THESE PROBLEMS MADE IT FOR YOU TO DO YOUR WORK, TAKE CARE OF THINGS AT HOME, OR GET ALONG WITH OTHER PEOPLE: SOMEWHAT DIFFICULT
5. BEING SO RESTLESS THAT IT IS HARD TO SIT STILL: NOT AT ALL
GAD7 TOTAL SCORE: 1

## 2023-05-23 ASSESSMENT — PATIENT HEALTH QUESTIONNAIRE - PHQ9
5. POOR APPETITE OR OVEREATING: NOT AT ALL
SUM OF ALL RESPONSES TO PHQ QUESTIONS 1-9: 2

## 2023-05-23 ASSESSMENT — ENCOUNTER SYMPTOMS
BREAST PAIN: 1
BREAST MASS: 1

## 2023-05-23 NOTE — NURSING NOTE
Prasanna Baldwin is a 63 year old adult patient that presents today in clinic for the following:    Chief Complaint   Patient presents with     Physical     Pt would like mammogram and breast exam     Breast Problem     The patient's allergies and medications were reviewed as noted. A set of vitals were recorded as noted without incident. The patient does not have any other questions for the provider.    Yaneth Middleton EMT at 2:02 PM on 5/23/2023

## 2023-05-23 NOTE — PROGRESS NOTES
"S: \"Via\" Prasanna Baldwin is a 63 year old adult who presents or an annual exam.  She and her partner are planning to move to [place] in September. They are purchasing a home there and she hopes to find work there.      She has noted bilateral breast tenderness and a tender area on the left breast near the areolar border at 3 o'clock. She has also had clear nipple discharge noted on her bra for the past three weeks. Last mammo 23 was normal.     She is planning to resume running next week.      No other new health complaints.          Patient Active Problem List   Diagnosis     Scapulothoracic syndrome     Gastroesophageal reflux disease without esophagitis     Klinefelter's syndrome diagnosed 1980     Gender dysphoria     Male-to-female transgender person     Adjustment disorder with depressed mood     Chronic pain of both shoulders            Past Medical History:   Diagnosis Date     Adjustment disorder with depressed mood 2020     Alcohol abuse     Hx     Depression      Depression      Gender dysphoria      GERD (gastroesophageal reflux disease)      Klinefelter syndrome      Male-to-female transgender person 12/3/2018     Right rib fracture     9th rib, non-displaced.      Scapulothoracic syndrome             Past Surgical History:   Procedure Laterality Date     ARTHROSCOPIC RECONSTRUCTION ANTERIOR CRUCIATE LIGAMENT  2001     ARTHROSCOPIC REPAIR ACL       CHOLECYSTECTOMY  2006     CHOLECYSTECTOMY       COLONOSCOPY N/A 2023    Procedure: COLONOSCOPY, FLEXIBLE, WITH LESION REMOVAL USING SNARE;  Surgeon: Suzanna Zimmer MD;  Location:  GI            Social History     Tobacco Use     Smoking status: Former     Types: Pipe, Cigarettes     Quit date: 2021     Years since quittin.8     Smokeless tobacco: Never     Tobacco comments:     1 pipe/day   Vaping Use     Vaping status: Never Used   Substance Use Topics     Alcohol use: Not Currently     Comment: occ beer          " 180.34 "  Family History   Problem Relation Age of Onset     Rheumatoid Arthritis Mother      Diabetes Type 2  Father      Dementia Father      Abdominal Aortic Aneurysm Father      Pancreatic Cancer Father      Prostate Cancer Father      Cancer Paternal Uncle         testicular               Allergies   Allergen Reactions     Influenza Vaccines      Adverse reaction     Nsaids      Other reaction(s): Stomach Upset  \"burns my stomach\" - requests NSAIDS to be put on her allergy list.            Current Outpatient Medications   Medication Sig Dispense Refill     buPROPion (WELLBUTRIN XL) 150 MG 24 hr tablet Take 1 tablet (150 mg) by mouth every morning With 300mg for total of 450mg 90 tablet 3     buPROPion (WELLBUTRIN XL) 300 MG 24 hr tablet Take 1 tablet (300 mg) by mouth every morning With 150mg for total of 450mg 90 tablet 3     estradiol (ESTRACE) 2 MG tablet Take 4 tablets (8 mg) by mouth daily  0     Multiple Vitamins-Minerals (EMERGEN-C IMMUNE PO) Take 1 tablet by mouth daily       progesterone (PROMETRIUM) 100 MG capsule Take 200 mg by mouth daily   1     spironolactone (ALDACTONE) 50 MG tablet Take 2 tablets (100 mg) by mouth daily 30 tablet 0     traZODone (DESYREL) 100 MG tablet Take 1 tablet (100 mg) by mouth nightly as needed for sleep 90 tablet 3       REVIEW OF SYSTEMS:  See above.    O:   /82 (BP Location: Right arm, Patient Position: Sitting, Cuff Size: Adult Regular)   Pulse 82   Ht 1.875 m (6' 1.82\")   Wt 84.2 kg (185 lb 11.2 oz)   SpO2 97%   BMI 23.96 kg/m    GENERAL APPEARANCE: healthy, alert and no distress  EYES:Grossly normal  HENT: ear canals and TM's normal and mouth without ulcers or lesions  RESP: lungs clear to auscultation - no rales, rhonchi or wheezes  CV: regular rates and rhythm, normal S1 S2, no S3 or S4 and no murmur, click or rub   ABDOMEN:  soft, nontender, no HSM or masses and bowel sounds normal  NEURO: normal.  MUSK: normal.  SKIN: normal.Both nipples are pierced. "   LYMPH:neg axillary, cervical, supra and infraclavicular nodes.  EXT: warm.  Edema: none  PSYCHE: She feels she is doing well.  BREASTS: no discrete masses, but tender area left breast near the areolar border at 3 o'clock. a  bilateral nipple piercing. clear discharge noted when piercing removed from right nipple.      A/P:    Prasanna was seen today for physical and breast problem.    Diagnoses and all orders for this visit:    Bilateral nipple discharge  -     MA Diagnostic Digital Bilateral; Future    Screening for thyroid disorder  -     TSH with free T4 reflex; Future    Elevated glucose  -     Basic metabolic panel  (Ca, Cl, CO2, Creat, Gluc, K, Na, BUN); Future  -     Hemoglobin A1c; Future    Screening for deficiency anemia  -     CBC with platelets; Future    The patient voiced understanding of the information discussed and all questions were answered.     I spent a total of 35 minutes in the care of this pt during today's office visit. This time includes reviewing the patient's chart and prior history, obtaining a history, performing an examination and evaluation and counseling the patient. This time also includes ordering medications or tests necessary in addition to communication to other member's of the patient's health care team. Time spent in documentation and care coordination is included.     Mara WARD CNP                  Answers for HPI/ROS submitted by the patient on 5/23/2023  General Symptoms: No  Skin Symptoms: No  HENT Symptoms: No  EYE SYMPTOMS: No  HEART SYMPTOMS: No  LUNG SYMPTOMS: No  INTESTINAL SYMPTOMS: No  URINARY SYMPTOMS: No  GYNECOLOGIC SYMPTOMS: No  REPRODUCTIVE SYMPTOMS: No  BREAST SYMPTOMS: Yes  SKELETAL SYMPTOMS: No  BLOOD SYMPTOMS: No  NERVOUS SYSTEM SYMPTOMS: No  MENTAL HEALTH SYMPTOMS: No  Discharge: Yes  Lumps: Yes  Pain: Yes  Nipple retraction: No

## 2023-05-24 PROBLEM — Z78.9 MALE-TO-FEMALE TRANSGENDER PERSON: Status: ACTIVE | Noted: 2018-12-03

## 2023-06-02 ENCOUNTER — HOSPITAL ENCOUNTER (OUTPATIENT)
Dept: MAMMOGRAPHY | Facility: CLINIC | Age: 63
Discharge: HOME | End: 2023-06-02
Attending: NURSE PRACTITIONER
Payer: COMMERCIAL

## 2023-06-02 DIAGNOSIS — N64.52 BILATERAL NIPPLE DISCHARGE: ICD-10-CM

## 2023-06-02 PROCEDURE — 76642 ULTRASOUND BREAST LIMITED: CPT | Mod: 50

## 2023-06-02 PROCEDURE — 77066 DX MAMMO INCL CAD BI: CPT

## 2023-06-14 ENCOUNTER — MYC MEDICAL ADVICE (OUTPATIENT)
Dept: INTERNAL MEDICINE | Facility: CLINIC | Age: 63
End: 2023-06-14
Payer: COMMERCIAL

## 2023-06-14 DIAGNOSIS — R92.8 ABNORMAL MAMMOGRAM: Primary | ICD-10-CM

## 2023-07-01 DIAGNOSIS — N64.52 NIPPLE DISCHARGE, BLOODY: Primary | ICD-10-CM

## 2023-07-24 ENCOUNTER — ANCILLARY PROCEDURE (OUTPATIENT)
Dept: MRI IMAGING | Facility: CLINIC | Age: 63
End: 2023-07-24
Attending: FAMILY MEDICINE
Payer: COMMERCIAL

## 2023-07-24 DIAGNOSIS — R92.8 ABNORMAL MAMMOGRAM: ICD-10-CM

## 2023-07-24 PROCEDURE — 77049 MRI BREAST C-+ W/CAD BI: CPT

## 2023-07-24 PROCEDURE — 255N000002 HC RX 255 OP 636: Mod: JZ | Performed by: FAMILY MEDICINE

## 2023-07-24 PROCEDURE — A9585 GADOBUTROL INJECTION: HCPCS | Mod: JZ | Performed by: FAMILY MEDICINE

## 2023-07-24 RX ORDER — GADOBUTROL 604.72 MG/ML
8.5 INJECTION INTRAVENOUS ONCE
Status: COMPLETED | OUTPATIENT
Start: 2023-07-24 | End: 2023-07-24

## 2023-07-24 RX ADMIN — GADOBUTROL 8.5 ML: 604.72 INJECTION INTRAVENOUS at 09:39

## 2023-07-31 ENCOUNTER — TRANSFERRED RECORDS (OUTPATIENT)
Dept: HEALTH INFORMATION MANAGEMENT | Facility: CLINIC | Age: 63
End: 2023-07-31

## 2023-12-07 DIAGNOSIS — F43.21 ADJUSTMENT DISORDER WITH DEPRESSED MOOD: ICD-10-CM

## 2023-12-08 RX ORDER — BUPROPION HYDROCHLORIDE 150 MG/1
150 TABLET ORAL EVERY MORNING
Qty: 90 TABLET | Refills: 0 | Status: SHIPPED | OUTPATIENT
Start: 2023-12-08

## 2023-12-08 RX ORDER — BUPROPION HYDROCHLORIDE 300 MG/1
300 TABLET ORAL EVERY MORNING
Qty: 90 TABLET | Refills: 0 | Status: SHIPPED | OUTPATIENT
Start: 2023-12-08

## 2023-12-18 DIAGNOSIS — F43.21 ADJUSTMENT DISORDER WITH DEPRESSED MOOD: ICD-10-CM

## 2023-12-18 RX ORDER — BUPROPION HYDROCHLORIDE 300 MG/1
TABLET ORAL
Qty: 90 TABLET | Refills: 0 | OUTPATIENT
Start: 2023-12-18

## 2023-12-18 RX ORDER — BUPROPION HYDROCHLORIDE 150 MG/1
TABLET ORAL
Qty: 90 TABLET | Refills: 0 | OUTPATIENT
Start: 2023-12-18

## 2024-02-22 ENCOUNTER — TELEPHONE (OUTPATIENT)
Dept: INTERNAL MEDICINE | Facility: CLINIC | Age: 64
End: 2024-02-22

## 2024-02-22 DIAGNOSIS — H61.23 BILATERAL IMPACTED CERUMEN: Primary | ICD-10-CM

## 2024-02-22 PROCEDURE — 99207 PR REMOVAL IMPACTED CERUMEN IRRIGATION/LVG UNILAT: CPT | Performed by: NURSE PRACTITIONER

## 2024-02-22 NOTE — TELEPHONE ENCOUNTER
----- Message from SYLVIA Flores CNP sent at 2/22/2024 12:29 PM CST -----  Regarding: needs appt for ear irrigation  Pt asking for appointment for bilateral ear irrigation on March 20 or 21st.  Can you call to schedule?    [phone].    Thanks,    Mara

## 2024-03-20 ENCOUNTER — ALLIED HEALTH/NURSE VISIT (OUTPATIENT)
Dept: INTERNAL MEDICINE | Facility: CLINIC | Age: 64
End: 2024-03-20
Payer: COMMERCIAL

## 2024-03-20 VITALS
SYSTOLIC BLOOD PRESSURE: 148 MMHG | BODY MASS INDEX: 25.7 KG/M2 | OXYGEN SATURATION: 98 % | WEIGHT: 199.2 LBS | DIASTOLIC BLOOD PRESSURE: 98 MMHG | HEART RATE: 78 BPM

## 2024-03-20 DIAGNOSIS — H61.23 BILATERAL IMPACTED CERUMEN: Primary | ICD-10-CM

## 2024-03-20 PROCEDURE — 99207 PR NO BILLABLE SERVICE THIS VISIT: CPT | Mod: RT

## 2024-03-20 NOTE — PROGRESS NOTES
Prasanna Baldwin [facility] the [facility] today at the request of the patient for an ear wash. The patient's ears were assessed for cerumen; no impacted cerumen was found in either the left or right ear. The patient discussed having had an ear wash done elsewhere a few weeks ago that had caused pain and bleeding in both ears. The left ear was clear of any cerumen and blood, however the right ear had a small amount of dried blood close to the opening of the ear canal, no cerumen found. The patient requested vital signs and weight check which can be seen in the Vitals section for this visit.     Kelli Parks, EMT at 2:45 PM on 3/20/2024

## 2024-03-21 ENCOUNTER — OFFICE VISIT (OUTPATIENT)
Dept: INTERNAL MEDICINE | Facility: CLINIC | Age: 64
End: 2024-03-21
Payer: COMMERCIAL

## 2024-03-21 VITALS
HEART RATE: 84 BPM | DIASTOLIC BLOOD PRESSURE: 87 MMHG | HEIGHT: 74 IN | BODY MASS INDEX: 25.35 KG/M2 | OXYGEN SATURATION: 98 % | SYSTOLIC BLOOD PRESSURE: 129 MMHG | WEIGHT: 197.5 LBS

## 2024-03-21 DIAGNOSIS — Z11.4 SCREENING FOR HIV (HUMAN IMMUNODEFICIENCY VIRUS): Primary | ICD-10-CM

## 2024-03-21 DIAGNOSIS — M54.41 CHRONIC BILATERAL LOW BACK PAIN WITH RIGHT-SIDED SCIATICA: ICD-10-CM

## 2024-03-21 DIAGNOSIS — G89.29 CHRONIC BILATERAL LOW BACK PAIN WITH RIGHT-SIDED SCIATICA: ICD-10-CM

## 2024-03-21 PROCEDURE — 99215 OFFICE O/P EST HI 40 MIN: CPT | Performed by: NURSE PRACTITIONER

## 2024-03-21 NOTE — PROGRESS NOTES
Assessment & Plan     Chronic bilateral low back pain with right-sided sciatica  - MR Sacroilliac Joints wwo Contrast; Future  - MR Lumbar Spine w/o Contrast; Future       I spent a total of 40  minutes in the care of this pt during today's office visit. This time includes reviewing the patient's chart and prior history, obtaining a history, performing an examination and evaluation and counseling the patient. This time also includes ordering medications or tests necessary in addition to communication to other member's of the patient's health care team. Time spent in documentation and care coordination is included.     Mara Beard SYLVIA, CNP      Subjective   Via is a 64 year old, presenting for the following health issues:  Pain (Pt here for lower back and right hip pain that has gradually worsened over past year and a half)      3/21/2024     2:19 PM   Additional Questions   Roomed by Melly Nunez   Accompanied by N/A     No known injury. Unable to do strenuous exercise due to right leg pain. Right leg feels longer then the left.  C/o numbness that radiates down the right leg and she c/o right knee throbbing. She is seeking an MRI to explain the cause of her back pain.     She was seen in clinic yesterday for bilateral ear irrigations.     Pain    History of Present Illness       Back Pain:  She presents for follow up of back pain. Patient's back pain is a chronic problem.  Location of back pain:  Right lower back, left lower back and right hip  Description of back pain: dull ache, gnawing, sharp and shooting  Back pain spreads: right thigh and right knee    Since patient first noticed back pain, pain is: gradually worsening  Does back pain interfere with her job:  Not applicable      Reason for visit:  Back and hip pain, ears, blood pressure, weight problems  Symptom onset:  More than a month  Symptom intensity:  Moderate  Symptom progression:  Worsening  Had these symptoms before:  No    She eats 4 or more  "servings of fruits and vegetables daily.She consumes 0 sweetened beverage(s) daily.She exercises with enough effort to increase her heart rate 9 or less minutes per day.  She exercises with enough effort to increase her heart rate 3 or less days per week.   She is taking medications regularly.       Patient Active Problem List   Diagnosis     Scapulothoracic syndrome     Gastroesophageal reflux disease without esophagitis     Klinefelter's syndrome diagnosed 12/1980     Gender dysphoria     Male-to-female transgender person     Adjustment disorder with depressed mood     Chronic pain of both shoulders         Objective    /87 (BP Location: Right arm, Patient Position: Sitting, Cuff Size: Adult Regular)   Pulse 84   Ht 1.875 m (6' 1.82\")   Wt 89.6 kg (197 lb 8 oz)   SpO2 98%   BMI 25.48 kg/m    Body mass index is 25.48 kg/m .  Physical Exam   GENERAL: alert and no distress  EYES: Eyes grossly normal to inspection.  HENT: ear canals and TM's normal.  BREAST: normal without masses, tenderness or nipple discharge. Piercings both nipples.  CV: regular rate and rhythm, normal S1 S2, no S3 or S4, no murmur, click or rub, no peripheral edema  ABDOMEN: soft, nontender, no hepatosplenomegaly, no masses and bowel sounds normal  MS: extremities normal- no gross deformities noted  NEURO:grossly normal  BACK: no CVA tenderness, tender to palpation over right SI joint   PSYCH: mentation appears normal, affect normal/bright            Signed Electronically by: SYLVIA Rodas CNP    "

## 2024-08-17 ENCOUNTER — HEALTH MAINTENANCE LETTER (OUTPATIENT)
Age: 64
End: 2024-08-17

## 2024-10-21 NOTE — PROGRESS NOTES
Subjective:  HPI  Physical Exam                    Objective:  System    Physical Exam    General     ROS    Assessment/Plan:    SUBJECTIVE  Subjective changes as noted by pt:   Pt. cont to fluctuate up and down with his shoulders. Some days are good and others are stiff and painful.   Current pain level:  4/10   Changes in function:  Yes (See Goal flowsheet attached for changes in current functional level)     Adverse reaction to treatment or activity:  None    OBJECTIVE  Changes in objective findings:  AROM L sh flex 128; abd 110; IR 50; ER 75. R sh flex 125; abd 120; IR 59; ER 40.     ASSESSMENT  Prasanna continues to require intervention to meet STG and LTG's: PT  Patient is progressing as expected.  Response to therapy has shown an improvement in  pain level and ROM   Progress made towards STG/LTG?  Yes (See Goal flowsheet attached for updates on achievement of STG and LTG)    PLAN  Current treatment program is being advanced to more complex exercises.    PTA/ATC plan:  N/A    Please refer to the daily flowsheet for treatment today, total treatment time and time spent performing 1:1 timed codes.        
1

## 2025-01-09 ENCOUNTER — OFFICE VISIT (OUTPATIENT)
Dept: INTERNAL MEDICINE | Facility: CLINIC | Age: 65
End: 2025-01-09
Payer: COMMERCIAL

## 2025-01-09 VITALS
BODY MASS INDEX: 24.81 KG/M2 | SYSTOLIC BLOOD PRESSURE: 126 MMHG | WEIGHT: 193.3 LBS | HEART RATE: 74 BPM | DIASTOLIC BLOOD PRESSURE: 85 MMHG | TEMPERATURE: 98.1 F | RESPIRATION RATE: 16 BRPM | OXYGEN SATURATION: 99 % | HEIGHT: 74 IN

## 2025-01-09 DIAGNOSIS — I10 ESSENTIAL HYPERTENSION: ICD-10-CM

## 2025-01-09 DIAGNOSIS — Z78.9 MALE-TO-FEMALE TRANSGENDER PERSON: ICD-10-CM

## 2025-01-09 DIAGNOSIS — Z11.4 SCREENING FOR HIV (HUMAN IMMUNODEFICIENCY VIRUS): Primary | ICD-10-CM

## 2025-01-09 DIAGNOSIS — F43.21 ADJUSTMENT DISORDER WITH DEPRESSED MOOD: ICD-10-CM

## 2025-01-09 DIAGNOSIS — F43.20 ADJUSTMENT DISORDER, UNSPECIFIED TYPE: ICD-10-CM

## 2025-01-09 RX ORDER — LOSARTAN POTASSIUM 25 MG/1
25 TABLET ORAL DAILY
COMMUNITY
Start: 2024-12-13 | End: 2025-01-09

## 2025-01-09 RX ORDER — LOSARTAN POTASSIUM 25 MG/1
25 TABLET ORAL DAILY
Qty: 90 TABLET | Refills: 3 | Status: SHIPPED | OUTPATIENT
Start: 2025-01-09

## 2025-01-09 RX ORDER — PROGESTERONE 100 MG/1
100 CAPSULE ORAL DAILY
Qty: 180 CAPSULE | Refills: 3 | Status: SHIPPED | OUTPATIENT
Start: 2025-01-09 | End: 2025-01-09

## 2025-01-09 RX ORDER — BUPROPION HYDROCHLORIDE 300 MG/1
300 TABLET ORAL EVERY MORNING
Qty: 90 TABLET | Refills: 3 | Status: SHIPPED | OUTPATIENT
Start: 2025-01-09

## 2025-01-09 RX ORDER — SPIRONOLACTONE 50 MG/1
100 TABLET, FILM COATED ORAL DAILY
Qty: 90 TABLET | Refills: 3 | Status: SHIPPED | OUTPATIENT
Start: 2025-01-09 | End: 2025-01-09

## 2025-01-09 RX ORDER — TRAZODONE HYDROCHLORIDE 100 MG/1
50 TABLET ORAL
Qty: 90 TABLET | Refills: 3 | Status: SHIPPED | OUTPATIENT
Start: 2025-01-09 | End: 2025-01-09

## 2025-01-09 RX ORDER — ESTRADIOL 2 MG/1
8 TABLET ORAL DAILY
Qty: 90 TABLET | Refills: 3 | Status: SHIPPED | OUTPATIENT
Start: 2025-01-09 | End: 2025-01-09

## 2025-01-09 RX ORDER — TRAZODONE HYDROCHLORIDE 100 MG/1
50 TABLET ORAL
Qty: 270 TABLET | Refills: 3 | Status: SHIPPED | OUTPATIENT
Start: 2025-01-09

## 2025-01-09 RX ORDER — SPIRONOLACTONE 50 MG/1
100 TABLET, FILM COATED ORAL DAILY
Qty: 180 TABLET | Refills: 3 | Status: SHIPPED | OUTPATIENT
Start: 2025-01-09

## 2025-01-09 RX ORDER — BUPROPION HYDROCHLORIDE 150 MG/1
150 TABLET ORAL EVERY MORNING
Qty: 90 TABLET | Refills: 3 | Status: SHIPPED | OUTPATIENT
Start: 2025-01-09

## 2025-01-09 RX ORDER — PROGESTERONE 100 MG/1
100 CAPSULE ORAL 2 TIMES DAILY
Qty: 90 CAPSULE | Refills: 3 | Status: SHIPPED | OUTPATIENT
Start: 2025-01-09

## 2025-01-09 RX ORDER — ESTRADIOL 2 MG/1
8 TABLET ORAL DAILY
Qty: 360 TABLET | Refills: 3 | Status: SHIPPED | OUTPATIENT
Start: 2025-01-09

## 2025-01-09 RX ORDER — PROGESTERONE 100 MG/1
100 CAPSULE ORAL 2 TIMES DAILY
Qty: 90 CAPSULE | Refills: 3 | Status: SHIPPED | OUTPATIENT
Start: 2025-01-09 | End: 2025-01-09

## 2025-01-09 NOTE — PROGRESS NOTES
"  {PROVIDER CHARTING PREFERENCE:353444}    Serjio Marquez is a 64 year old, presenting for the following health issues:  Follow Up, Hypertension, Recheck Medication (Pt recently started Losartan), and Refill Request (Pt would like any refills due for renewal)      1/9/2025     2:48 PM   Additional Questions   Roomed by carolin de los santos     History of Present Illness       Reason for visit:  Follow up    She eats 2-3 servings of fruits and vegetables daily.She consumes 0 sweetened beverage(s) daily.She exercises with enough effort to increase her heart rate 30 to 60 minutes per day.  She exercises with enough effort to increase her heart rate 4 days per week.   She is taking medications regularly.       {MA/LPN/RN Pre-Provider Visit Orders- hCG/UA/Strep (Optional):323221}  {SUPERLIST (Optional):228440}  {additonal problems for provider to add (Optional):319296}    {ROS Picklists (Optional):874228}      Objective    /85 (BP Location: Right arm, Patient Position: Sitting, Cuff Size: Adult Large)   Pulse 74   Temp 98.1  F (36.7  C) (Oral)   Resp 16   Ht 1.875 m (6' 1.82\")   Wt 87.7 kg (193 lb 4.8 oz)   SpO2 99%   BMI 24.94 kg/m    Body mass index is 24.94 kg/m .  Physical Exam   {Exam List (Optional):565260}    {Diagnostic Test Results (Optional):687044}        Signed Electronically by: SYLVIA Rodas CNP  {Email feedback regarding this note to [email or web address]   :679087}  "

## 2025-01-20 DIAGNOSIS — F43.20 ADJUSTMENT DISORDER, UNSPECIFIED TYPE: ICD-10-CM

## 2025-01-20 DIAGNOSIS — G47.9 SLEEP DISORDER: Primary | ICD-10-CM

## 2025-01-20 RX ORDER — TRAZODONE HYDROCHLORIDE 100 MG/1
100-150 TABLET ORAL
Status: SHIPPED | DISCHARGE
Start: 2025-01-20 | End: 2025-01-20 | Stop reason: DRUGHIGH

## 2025-01-20 RX ORDER — TRAZODONE HYDROCHLORIDE 50 MG/1
TABLET, FILM COATED ORAL
Qty: 250 TABLET | Refills: 3 | Status: SHIPPED | OUTPATIENT
Start: 2025-01-20

## 2025-01-20 NOTE — TELEPHONE ENCOUNTER
[facility] Call Center    Phone Message    May a detailed message be left on voicemail: yes     Reason for Call: Medication Question or concern regarding medication   Prescription Clarification  Name of Medication: traZODone (DESYREL) 100 MG tablet   Prescribing Provider: Chirag   Pharmacy: [facility]   What on the order needs clarification? Different set of instructions, please call to clarify.        Action Taken: Message routed to:  [facility]    Travel Screening: Not Applicable     Date of Service:

## 2025-08-12 ENCOUNTER — OFFICE VISIT (OUTPATIENT)
Dept: INTERNAL MEDICINE | Facility: CLINIC | Age: 65
End: 2025-08-12
Payer: MEDICARE

## 2025-08-12 VITALS
HEART RATE: 74 BPM | OXYGEN SATURATION: 98 % | SYSTOLIC BLOOD PRESSURE: 117 MMHG | WEIGHT: 190 LBS | RESPIRATION RATE: 15 BRPM | BODY MASS INDEX: 24.38 KG/M2 | HEIGHT: 74 IN | DIASTOLIC BLOOD PRESSURE: 79 MMHG

## 2025-08-12 DIAGNOSIS — I10 ESSENTIAL HYPERTENSION: Primary | ICD-10-CM

## 2025-08-12 DIAGNOSIS — Z13.6 SCREENING FOR AAA (ABDOMINAL AORTIC ANEURYSM): ICD-10-CM

## 2025-08-12 PROCEDURE — 3078F DIAST BP <80 MM HG: CPT | Performed by: NURSE PRACTITIONER

## 2025-08-12 PROCEDURE — 3074F SYST BP LT 130 MM HG: CPT | Performed by: NURSE PRACTITIONER

## 2025-08-12 PROCEDURE — 99213 OFFICE O/P EST LOW 20 MIN: CPT | Performed by: NURSE PRACTITIONER

## (undated) RX ORDER — FENTANYL CITRATE 50 UG/ML
INJECTION, SOLUTION INTRAMUSCULAR; INTRAVENOUS
Status: DISPENSED
Start: 2023-01-23